# Patient Record
Sex: FEMALE | Race: WHITE | Employment: OTHER | URBAN - METROPOLITAN AREA
[De-identification: names, ages, dates, MRNs, and addresses within clinical notes are randomized per-mention and may not be internally consistent; named-entity substitution may affect disease eponyms.]

---

## 2017-03-09 ENCOUNTER — HOSPITAL ENCOUNTER (OUTPATIENT)
Dept: ULTRASOUND IMAGING | Age: 67
Discharge: HOME | End: 2017-03-09
Attending: INTERNAL MEDICINE
Payer: MEDICARE

## 2017-03-09 DIAGNOSIS — D70.9 EOSINOPENIA (HCC): ICD-10-CM

## 2017-03-09 PROCEDURE — 76700 US EXAM ABDOM COMPLETE: CPT

## 2018-01-17 ENCOUNTER — HOSPITAL ENCOUNTER (OUTPATIENT)
Dept: PREADMISSION TESTING | Age: 68
Discharge: HOME | End: 2018-01-17
Payer: MEDICARE

## 2018-01-17 VITALS
DIASTOLIC BLOOD PRESSURE: 72 MMHG | BODY MASS INDEX: 26.37 KG/M2 | HEIGHT: 67 IN | TEMPERATURE: 97.6 F | WEIGHT: 168 LBS | HEART RATE: 54 BPM | SYSTOLIC BLOOD PRESSURE: 123 MMHG

## 2018-01-17 LAB
ABO + RH BLD: NORMAL
ANION GAP SERPL CALC-SCNC: 6 MMOL/L (ref 5–15)
APPEARANCE UR: CLEAR
ATRIAL RATE: 43 BPM
BACTERIA URNS QL MICRO: NEGATIVE /HPF
BILIRUB UR QL: NEGATIVE
BLOOD GROUP ANTIBODIES SERPL: NORMAL
BUN SERPL-MCNC: 20 MG/DL (ref 6–20)
BUN/CREAT SERPL: 18 (ref 12–20)
CALCIUM SERPL-MCNC: 9.9 MG/DL (ref 8.5–10.1)
CALCULATED P AXIS, ECG09: 62 DEGREES
CALCULATED R AXIS, ECG10: 44 DEGREES
CALCULATED T AXIS, ECG11: 61 DEGREES
CHLORIDE SERPL-SCNC: 104 MMOL/L (ref 97–108)
CO2 SERPL-SCNC: 30 MMOL/L (ref 21–32)
COLOR UR: ABNORMAL
CREAT SERPL-MCNC: 1.11 MG/DL (ref 0.55–1.02)
DIAGNOSIS, 93000: NORMAL
EPITH CASTS URNS QL MICRO: ABNORMAL /LPF
ERYTHROCYTE [DISTWIDTH] IN BLOOD BY AUTOMATED COUNT: 13.1 % (ref 11.5–14.5)
EST. AVERAGE GLUCOSE BLD GHB EST-MCNC: 103 MG/DL
GLUCOSE SERPL-MCNC: 88 MG/DL (ref 65–100)
GLUCOSE UR STRIP.AUTO-MCNC: 100 MG/DL
HBA1C MFR BLD: 5.2 % (ref 4.2–6.3)
HCT VFR BLD AUTO: 42 % (ref 35–47)
HGB BLD-MCNC: 14 G/DL (ref 11.5–16)
HGB UR QL STRIP: NEGATIVE
HYALINE CASTS URNS QL MICRO: ABNORMAL /LPF (ref 0–5)
INR PPP: 1 (ref 0.9–1.1)
KETONES UR QL STRIP.AUTO: NEGATIVE MG/DL
LEUKOCYTE ESTERASE UR QL STRIP.AUTO: NEGATIVE
MCH RBC QN AUTO: 30.8 PG (ref 26–34)
MCHC RBC AUTO-ENTMCNC: 33.3 G/DL (ref 30–36.5)
MCV RBC AUTO: 92.3 FL (ref 80–99)
NITRITE UR QL STRIP.AUTO: NEGATIVE
P-R INTERVAL, ECG05: 160 MS
PH UR STRIP: 6.5 [PH] (ref 5–8)
PLATELET # BLD AUTO: 243 K/UL (ref 150–400)
POTASSIUM SERPL-SCNC: 4.6 MMOL/L (ref 3.5–5.1)
PROT UR STRIP-MCNC: NEGATIVE MG/DL
PROTHROMBIN TIME: 10.6 SEC (ref 9–11.1)
Q-T INTERVAL, ECG07: 448 MS
QRS DURATION, ECG06: 80 MS
QTC CALCULATION (BEZET), ECG08: 378 MS
RBC # BLD AUTO: 4.55 M/UL (ref 3.8–5.2)
RBC #/AREA URNS HPF: ABNORMAL /HPF (ref 0–5)
SODIUM SERPL-SCNC: 140 MMOL/L (ref 136–145)
SP GR UR REFRACTOMETRY: 1.01 (ref 1–1.03)
SPECIMEN EXP DATE BLD: NORMAL
UA: UC IF INDICATED,UAUC: ABNORMAL
UROBILINOGEN UR QL STRIP.AUTO: 0.2 EU/DL (ref 0.2–1)
VENTRICULAR RATE, ECG03: 43 BPM
WBC # BLD AUTO: 3.7 K/UL (ref 3.6–11)
WBC URNS QL MICRO: ABNORMAL /HPF (ref 0–4)

## 2018-01-17 PROCEDURE — 86900 BLOOD TYPING SEROLOGIC ABO: CPT | Performed by: ORTHOPAEDIC SURGERY

## 2018-01-17 PROCEDURE — 93005 ELECTROCARDIOGRAM TRACING: CPT

## 2018-01-17 PROCEDURE — 80048 BASIC METABOLIC PNL TOTAL CA: CPT | Performed by: ORTHOPAEDIC SURGERY

## 2018-01-17 PROCEDURE — 85027 COMPLETE CBC AUTOMATED: CPT | Performed by: ORTHOPAEDIC SURGERY

## 2018-01-17 PROCEDURE — 85610 PROTHROMBIN TIME: CPT | Performed by: ORTHOPAEDIC SURGERY

## 2018-01-17 PROCEDURE — 83036 HEMOGLOBIN GLYCOSYLATED A1C: CPT | Performed by: ORTHOPAEDIC SURGERY

## 2018-01-17 PROCEDURE — 36415 COLL VENOUS BLD VENIPUNCTURE: CPT | Performed by: ORTHOPAEDIC SURGERY

## 2018-01-17 PROCEDURE — 81001 URINALYSIS AUTO W/SCOPE: CPT | Performed by: ORTHOPAEDIC SURGERY

## 2018-01-17 RX ORDER — GLUCOSAMINE/CHONDR SU A SOD 750-600 MG
1000 TABLET ORAL DAILY
COMMUNITY
End: 2022-04-22 | Stop reason: ALTCHOICE

## 2018-01-17 RX ORDER — MINERAL OIL
180 ENEMA (ML) RECTAL DAILY
COMMUNITY

## 2018-01-18 LAB
BACTERIA SPEC CULT: NORMAL
BACTERIA SPEC CULT: NORMAL
SERVICE CMNT-IMP: NORMAL

## 2018-01-19 NOTE — PERIOP NOTES
EKG REVIEWED BY DR. Alexsander Atkinson AND PT NEEDS A CARDIAC CONSULT FOR SURGERY.  SPOKE C DONALD SHANNON'S NURSE AND SHE IS GOING TO CONTACT THE PT.

## 2018-02-07 ENCOUNTER — ANESTHESIA EVENT (OUTPATIENT)
Dept: SURGERY | Age: 68
DRG: 470 | End: 2018-02-07
Payer: MEDICARE

## 2018-02-07 RX ORDER — ACETAMINOPHEN 500 MG
1000 TABLET ORAL ONCE
Status: CANCELLED | OUTPATIENT
Start: 2018-02-07 | End: 2018-02-07

## 2018-02-08 ENCOUNTER — HOSPITAL ENCOUNTER (INPATIENT)
Age: 68
LOS: 1 days | Discharge: HOME HEALTH | DRG: 470 | End: 2018-02-10
Attending: ORTHOPAEDIC SURGERY | Admitting: ORTHOPAEDIC SURGERY
Payer: MEDICARE

## 2018-02-08 ENCOUNTER — ANESTHESIA (OUTPATIENT)
Dept: SURGERY | Age: 68
DRG: 470 | End: 2018-02-08
Payer: MEDICARE

## 2018-02-08 DIAGNOSIS — Z96.651 STATUS POST RIGHT KNEE REPLACEMENT: Primary | ICD-10-CM

## 2018-02-08 PROBLEM — M17.11 PRIMARY OSTEOARTHRITIS OF ONE KNEE, RIGHT: Status: ACTIVE | Noted: 2018-02-08

## 2018-02-08 LAB
ABO + RH BLD: NORMAL
BLOOD GROUP ANTIBODIES SERPL: NORMAL
GLUCOSE BLD STRIP.AUTO-MCNC: 89 MG/DL (ref 65–100)
SERVICE CMNT-IMP: NORMAL
SPECIMEN EXP DATE BLD: NORMAL

## 2018-02-08 PROCEDURE — 76010000171 HC OR TIME 2 TO 2.5 HR INTENSV-TIER 1: Performed by: ORTHOPAEDIC SURGERY

## 2018-02-08 PROCEDURE — C1713 ANCHOR/SCREW BN/BN,TIS/BN: HCPCS | Performed by: ORTHOPAEDIC SURGERY

## 2018-02-08 PROCEDURE — 82962 GLUCOSE BLOOD TEST: CPT

## 2018-02-08 PROCEDURE — 77030020788: Performed by: ORTHOPAEDIC SURGERY

## 2018-02-08 PROCEDURE — G8979 MOBILITY GOAL STATUS: HCPCS

## 2018-02-08 PROCEDURE — 0SRC0J9 REPLACEMENT OF RIGHT KNEE JOINT WITH SYNTHETIC SUBSTITUTE, CEMENTED, OPEN APPROACH: ICD-10-PCS | Performed by: ORTHOPAEDIC SURGERY

## 2018-02-08 PROCEDURE — 99218 HC RM OBSERVATION: CPT

## 2018-02-08 PROCEDURE — 77030018822 HC SLV COMPR FT COVD -B

## 2018-02-08 PROCEDURE — 77030020365 HC SOL INJ SOD CL 0.9% 50ML: Performed by: ORTHOPAEDIC SURGERY

## 2018-02-08 PROCEDURE — 97116 GAIT TRAINING THERAPY: CPT

## 2018-02-08 PROCEDURE — 77030011640 HC PAD GRND REM COVD -A: Performed by: ORTHOPAEDIC SURGERY

## 2018-02-08 PROCEDURE — 77030027138 HC INCENT SPIROMETER -A

## 2018-02-08 PROCEDURE — 77030003601 HC NDL NRV BLK BBMI -A

## 2018-02-08 PROCEDURE — 74011250636 HC RX REV CODE- 250/636: Performed by: ANESTHESIOLOGY

## 2018-02-08 PROCEDURE — 74011000250 HC RX REV CODE- 250: Performed by: ORTHOPAEDIC SURGERY

## 2018-02-08 PROCEDURE — 76210000006 HC OR PH I REC 0.5 TO 1 HR: Performed by: ORTHOPAEDIC SURGERY

## 2018-02-08 PROCEDURE — 74011250636 HC RX REV CODE- 250/636

## 2018-02-08 PROCEDURE — 77030005515 HC CATH URETH FOL14 BARD -B: Performed by: ORTHOPAEDIC SURGERY

## 2018-02-08 PROCEDURE — 77030014077 HC TOWER MX CEM J&J -C: Performed by: ORTHOPAEDIC SURGERY

## 2018-02-08 PROCEDURE — G8978 MOBILITY CURRENT STATUS: HCPCS

## 2018-02-08 PROCEDURE — 74011000250 HC RX REV CODE- 250

## 2018-02-08 PROCEDURE — 36415 COLL VENOUS BLD VENIPUNCTURE: CPT | Performed by: ORTHOPAEDIC SURGERY

## 2018-02-08 PROCEDURE — 77030010507 HC ADH SKN DERMBND J&J -B: Performed by: ORTHOPAEDIC SURGERY

## 2018-02-08 PROCEDURE — 77030018836 HC SOL IRR NACL ICUM -A: Performed by: ORTHOPAEDIC SURGERY

## 2018-02-08 PROCEDURE — 77030031139 HC SUT VCRL2 J&J -A: Performed by: ORTHOPAEDIC SURGERY

## 2018-02-08 PROCEDURE — 77030020782 HC GWN BAIR PAWS FLX 3M -B

## 2018-02-08 PROCEDURE — 74011250637 HC RX REV CODE- 250/637: Performed by: ORTHOPAEDIC SURGERY

## 2018-02-08 PROCEDURE — 77030012935 HC DRSG AQUACEL BMS -B: Performed by: ORTHOPAEDIC SURGERY

## 2018-02-08 PROCEDURE — 77030033067 HC SUT PDO STRATFX SPIR J&J -B: Performed by: ORTHOPAEDIC SURGERY

## 2018-02-08 PROCEDURE — C9290 INJ, BUPIVACAINE LIPOSOME: HCPCS | Performed by: ORTHOPAEDIC SURGERY

## 2018-02-08 PROCEDURE — 77030000032 HC CUF TRNQT ZIMM -B: Performed by: ORTHOPAEDIC SURGERY

## 2018-02-08 PROCEDURE — 97161 PT EVAL LOW COMPLEX 20 MIN: CPT

## 2018-02-08 PROCEDURE — 77030002933 HC SUT MCRYL J&J -A: Performed by: ORTHOPAEDIC SURGERY

## 2018-02-08 PROCEDURE — 76060000035 HC ANESTHESIA 2 TO 2.5 HR: Performed by: ORTHOPAEDIC SURGERY

## 2018-02-08 PROCEDURE — 74011000258 HC RX REV CODE- 258: Performed by: ORTHOPAEDIC SURGERY

## 2018-02-08 PROCEDURE — 77030018846 HC SOL IRR STRL H20 ICUM -A: Performed by: ORTHOPAEDIC SURGERY

## 2018-02-08 PROCEDURE — 77030016547 HC BLD SAW SAG1 STRY -B: Performed by: ORTHOPAEDIC SURGERY

## 2018-02-08 PROCEDURE — 86901 BLOOD TYPING SEROLOGIC RH(D): CPT | Performed by: ORTHOPAEDIC SURGERY

## 2018-02-08 PROCEDURE — C1776 JOINT DEVICE (IMPLANTABLE): HCPCS | Performed by: ORTHOPAEDIC SURGERY

## 2018-02-08 PROCEDURE — 74011250636 HC RX REV CODE- 250/636: Performed by: ORTHOPAEDIC SURGERY

## 2018-02-08 DEVICE — TIB PRN NP STM 5 DEG SZ ER -- PERSONA: Type: IMPLANTABLE DEVICE | Site: KNEE | Status: FUNCTIONAL

## 2018-02-08 DEVICE — IMPLANTABLE DEVICE: Type: IMPLANTABLE DEVICE | Site: KNEE | Status: FUNCTIONAL

## 2018-02-08 DEVICE — INSERT ALL POLY PAT PLY 32MM -- PERSONA: Type: IMPLANTABLE DEVICE | Site: KNEE | Status: FUNCTIONAL

## 2018-02-08 DEVICE — CEMENT BNE GENTAMICIN 40 GM SMARTSET GMV: Type: IMPLANTABLE DEVICE | Site: KNEE | Status: FUNCTIONAL

## 2018-02-08 RX ORDER — SODIUM CHLORIDE 0.9 % (FLUSH) 0.9 %
5-10 SYRINGE (ML) INJECTION AS NEEDED
Status: DISCONTINUED | OUTPATIENT
Start: 2018-02-08 | End: 2018-02-10 | Stop reason: HOSPADM

## 2018-02-08 RX ORDER — NALOXONE HYDROCHLORIDE 0.4 MG/ML
0.4 INJECTION, SOLUTION INTRAMUSCULAR; INTRAVENOUS; SUBCUTANEOUS AS NEEDED
Status: DISCONTINUED | OUTPATIENT
Start: 2018-02-08 | End: 2018-02-10 | Stop reason: HOSPADM

## 2018-02-08 RX ORDER — ONDANSETRON 2 MG/ML
INJECTION INTRAMUSCULAR; INTRAVENOUS AS NEEDED
Status: DISCONTINUED | OUTPATIENT
Start: 2018-02-08 | End: 2018-02-08 | Stop reason: HOSPADM

## 2018-02-08 RX ORDER — DIPHENHYDRAMINE HYDROCHLORIDE 50 MG/ML
12.5 INJECTION, SOLUTION INTRAMUSCULAR; INTRAVENOUS AS NEEDED
Status: DISCONTINUED | OUTPATIENT
Start: 2018-02-08 | End: 2018-02-08 | Stop reason: HOSPADM

## 2018-02-08 RX ORDER — MIDAZOLAM HYDROCHLORIDE 1 MG/ML
1 INJECTION, SOLUTION INTRAMUSCULAR; INTRAVENOUS AS NEEDED
Status: DISCONTINUED | OUTPATIENT
Start: 2018-02-08 | End: 2018-02-08 | Stop reason: HOSPADM

## 2018-02-08 RX ORDER — MIDAZOLAM HYDROCHLORIDE 1 MG/ML
INJECTION, SOLUTION INTRAMUSCULAR; INTRAVENOUS AS NEEDED
Status: DISCONTINUED | OUTPATIENT
Start: 2018-02-08 | End: 2018-02-08 | Stop reason: HOSPADM

## 2018-02-08 RX ORDER — ACETAMINOPHEN 500 MG
500 TABLET ORAL
Status: DISCONTINUED | OUTPATIENT
Start: 2018-02-08 | End: 2018-02-10 | Stop reason: HOSPADM

## 2018-02-08 RX ORDER — PROPOFOL 10 MG/ML
INJECTION, EMULSION INTRAVENOUS
Status: DISCONTINUED | OUTPATIENT
Start: 2018-02-08 | End: 2018-02-08 | Stop reason: HOSPADM

## 2018-02-08 RX ORDER — PROPOFOL 10 MG/ML
INJECTION, EMULSION INTRAVENOUS AS NEEDED
Status: DISCONTINUED | OUTPATIENT
Start: 2018-02-08 | End: 2018-02-08 | Stop reason: HOSPADM

## 2018-02-08 RX ORDER — MIDAZOLAM HYDROCHLORIDE 1 MG/ML
0.5 INJECTION, SOLUTION INTRAMUSCULAR; INTRAVENOUS
Status: DISCONTINUED | OUTPATIENT
Start: 2018-02-08 | End: 2018-02-08 | Stop reason: HOSPADM

## 2018-02-08 RX ORDER — POLYVINYL ALCOHOL 14 MG/ML
1-2 SOLUTION/ DROPS OPHTHALMIC AS NEEDED
Status: DISCONTINUED | OUTPATIENT
Start: 2018-02-08 | End: 2018-02-08 | Stop reason: SDUPTHER

## 2018-02-08 RX ORDER — OXYCODONE HYDROCHLORIDE 5 MG/1
5 TABLET ORAL
Status: DISCONTINUED | OUTPATIENT
Start: 2018-02-08 | End: 2018-02-10 | Stop reason: HOSPADM

## 2018-02-08 RX ORDER — HYDROMORPHONE HYDROCHLORIDE 1 MG/ML
0.5 INJECTION, SOLUTION INTRAMUSCULAR; INTRAVENOUS; SUBCUTANEOUS
Status: DISPENSED | OUTPATIENT
Start: 2018-02-08 | End: 2018-02-09

## 2018-02-08 RX ORDER — ROPIVACAINE HYDROCHLORIDE 5 MG/ML
150 INJECTION, SOLUTION EPIDURAL; INFILTRATION; PERINEURAL AS NEEDED
Status: DISCONTINUED | OUTPATIENT
Start: 2018-02-08 | End: 2018-02-08 | Stop reason: HOSPADM

## 2018-02-08 RX ORDER — BUPIVACAINE HYDROCHLORIDE 7.5 MG/ML
INJECTION, SOLUTION EPIDURAL; RETROBULBAR AS NEEDED
Status: DISCONTINUED | OUTPATIENT
Start: 2018-02-08 | End: 2018-02-08 | Stop reason: HOSPADM

## 2018-02-08 RX ORDER — PHENYLEPHRINE HCL IN 0.9% NACL 0.4MG/10ML
SYRINGE (ML) INTRAVENOUS AS NEEDED
Status: DISCONTINUED | OUTPATIENT
Start: 2018-02-08 | End: 2018-02-08 | Stop reason: HOSPADM

## 2018-02-08 RX ORDER — VANCOMYCIN/0.9 % SOD CHLORIDE 1.5G/250ML
1500 PLASTIC BAG, INJECTION (ML) INTRAVENOUS ONCE
Status: COMPLETED | OUTPATIENT
Start: 2018-02-08 | End: 2018-02-08

## 2018-02-08 RX ORDER — ACETAMINOPHEN 500 MG
1000 TABLET ORAL ONCE
Status: COMPLETED | OUTPATIENT
Start: 2018-02-08 | End: 2018-02-08

## 2018-02-08 RX ORDER — CETIRIZINE HCL 10 MG
10 TABLET ORAL DAILY
Status: DISCONTINUED | OUTPATIENT
Start: 2018-02-09 | End: 2018-02-10 | Stop reason: HOSPADM

## 2018-02-08 RX ORDER — SODIUM CHLORIDE 9 MG/ML
125 INJECTION, SOLUTION INTRAVENOUS CONTINUOUS
Status: DISPENSED | OUTPATIENT
Start: 2018-02-08 | End: 2018-02-09

## 2018-02-08 RX ORDER — POLYETHYLENE GLYCOL 3350 17 G/17G
17 POWDER, FOR SOLUTION ORAL DAILY
Status: DISCONTINUED | OUTPATIENT
Start: 2018-02-09 | End: 2018-02-10 | Stop reason: HOSPADM

## 2018-02-08 RX ORDER — SODIUM CHLORIDE 0.9 % (FLUSH) 0.9 %
5-10 SYRINGE (ML) INJECTION EVERY 8 HOURS
Status: DISCONTINUED | OUTPATIENT
Start: 2018-02-08 | End: 2018-02-08 | Stop reason: HOSPADM

## 2018-02-08 RX ORDER — FACIAL-BODY WIPES
10 EACH TOPICAL DAILY PRN
Status: DISCONTINUED | OUTPATIENT
Start: 2018-02-10 | End: 2018-02-10 | Stop reason: HOSPADM

## 2018-02-08 RX ORDER — SODIUM CHLORIDE, SODIUM LACTATE, POTASSIUM CHLORIDE, CALCIUM CHLORIDE 600; 310; 30; 20 MG/100ML; MG/100ML; MG/100ML; MG/100ML
100 INJECTION, SOLUTION INTRAVENOUS CONTINUOUS
Status: DISCONTINUED | OUTPATIENT
Start: 2018-02-08 | End: 2018-02-08 | Stop reason: HOSPADM

## 2018-02-08 RX ORDER — LIDOCAINE HYDROCHLORIDE 10 MG/ML
0.1 INJECTION, SOLUTION EPIDURAL; INFILTRATION; INTRACAUDAL; PERINEURAL AS NEEDED
Status: DISCONTINUED | OUTPATIENT
Start: 2018-02-08 | End: 2018-02-08 | Stop reason: HOSPADM

## 2018-02-08 RX ORDER — EPHEDRINE SULFATE 50 MG/ML
INJECTION, SOLUTION INTRAVENOUS AS NEEDED
Status: DISCONTINUED | OUTPATIENT
Start: 2018-02-08 | End: 2018-02-08 | Stop reason: HOSPADM

## 2018-02-08 RX ORDER — POLYVINYL ALCOHOL 14 MG/ML
1 SOLUTION/ DROPS OPHTHALMIC AS NEEDED
Status: DISCONTINUED | OUTPATIENT
Start: 2018-02-08 | End: 2018-02-10 | Stop reason: HOSPADM

## 2018-02-08 RX ORDER — OXYCODONE HYDROCHLORIDE 5 MG/1
10 TABLET ORAL
Status: DISCONTINUED | OUTPATIENT
Start: 2018-02-08 | End: 2018-02-10 | Stop reason: HOSPADM

## 2018-02-08 RX ORDER — FLUTICASONE PROPIONATE 50 MCG
2 SPRAY, SUSPENSION (ML) NASAL
Status: DISCONTINUED | OUTPATIENT
Start: 2018-02-08 | End: 2018-02-10 | Stop reason: HOSPADM

## 2018-02-08 RX ORDER — AMOXICILLIN 250 MG
1 CAPSULE ORAL 2 TIMES DAILY
Status: DISCONTINUED | OUTPATIENT
Start: 2018-02-08 | End: 2018-02-10 | Stop reason: HOSPADM

## 2018-02-08 RX ORDER — FENTANYL CITRATE 50 UG/ML
25 INJECTION, SOLUTION INTRAMUSCULAR; INTRAVENOUS
Status: DISCONTINUED | OUTPATIENT
Start: 2018-02-08 | End: 2018-02-08 | Stop reason: HOSPADM

## 2018-02-08 RX ORDER — ASPIRIN 325 MG
325 TABLET, DELAYED RELEASE (ENTERIC COATED) ORAL 2 TIMES DAILY
Status: DISCONTINUED | OUTPATIENT
Start: 2018-02-08 | End: 2018-02-10 | Stop reason: HOSPADM

## 2018-02-08 RX ORDER — ONDANSETRON 2 MG/ML
4 INJECTION INTRAMUSCULAR; INTRAVENOUS
Status: ACTIVE | OUTPATIENT
Start: 2018-02-08 | End: 2018-02-09

## 2018-02-08 RX ORDER — HYDROXYZINE HYDROCHLORIDE 10 MG/1
10 TABLET, FILM COATED ORAL
Status: DISCONTINUED | OUTPATIENT
Start: 2018-02-08 | End: 2018-02-10 | Stop reason: HOSPADM

## 2018-02-08 RX ORDER — SODIUM CHLORIDE 0.9 % (FLUSH) 0.9 %
5-10 SYRINGE (ML) INJECTION AS NEEDED
Status: DISCONTINUED | OUTPATIENT
Start: 2018-02-08 | End: 2018-02-08 | Stop reason: HOSPADM

## 2018-02-08 RX ORDER — SODIUM CHLORIDE 0.9 % (FLUSH) 0.9 %
5-10 SYRINGE (ML) INJECTION EVERY 8 HOURS
Status: DISCONTINUED | OUTPATIENT
Start: 2018-02-09 | End: 2018-02-10 | Stop reason: HOSPADM

## 2018-02-08 RX ORDER — FENTANYL CITRATE 50 UG/ML
50 INJECTION, SOLUTION INTRAMUSCULAR; INTRAVENOUS AS NEEDED
Status: DISCONTINUED | OUTPATIENT
Start: 2018-02-08 | End: 2018-02-08 | Stop reason: HOSPADM

## 2018-02-08 RX ORDER — MORPHINE SULFATE 10 MG/ML
2 INJECTION, SOLUTION INTRAMUSCULAR; INTRAVENOUS
Status: DISCONTINUED | OUTPATIENT
Start: 2018-02-08 | End: 2018-02-08 | Stop reason: HOSPADM

## 2018-02-08 RX ADMIN — ASPIRIN 325 MG: 325 TABLET, DELAYED RELEASE ORAL at 13:28

## 2018-02-08 RX ADMIN — PROPOFOL 50 MCG/KG/MIN: 10 INJECTION, EMULSION INTRAVENOUS at 07:42

## 2018-02-08 RX ADMIN — MIDAZOLAM HYDROCHLORIDE 2 MG: 1 INJECTION, SOLUTION INTRAMUSCULAR; INTRAVENOUS at 07:31

## 2018-02-08 RX ADMIN — DOCUSATE SODIUM AND SENNOSIDES 1 TABLET: 8.6; 5 TABLET, FILM COATED ORAL at 17:40

## 2018-02-08 RX ADMIN — ACETAMINOPHEN 1000 MG: 500 TABLET ORAL at 06:46

## 2018-02-08 RX ADMIN — FENTANYL CITRATE 50 MCG: 50 INJECTION, SOLUTION INTRAMUSCULAR; INTRAVENOUS at 07:12

## 2018-02-08 RX ADMIN — ACETAMINOPHEN 500 MG: 500 TABLET ORAL at 13:28

## 2018-02-08 RX ADMIN — ONDANSETRON 4 MG: 2 INJECTION INTRAMUSCULAR; INTRAVENOUS at 09:25

## 2018-02-08 RX ADMIN — VANCOMYCIN HYDROCHLORIDE 1500 MG: 10 INJECTION, POWDER, LYOPHILIZED, FOR SOLUTION INTRAVENOUS at 18:40

## 2018-02-08 RX ADMIN — ASPIRIN 325 MG: 325 TABLET, DELAYED RELEASE ORAL at 21:29

## 2018-02-08 RX ADMIN — BUPIVACAINE HYDROCHLORIDE 11 MG: 7.5 INJECTION, SOLUTION EPIDURAL; RETROBULBAR at 07:42

## 2018-02-08 RX ADMIN — DOCUSATE SODIUM AND SENNOSIDES 1 TABLET: 8.6; 5 TABLET, FILM COATED ORAL at 13:28

## 2018-02-08 RX ADMIN — VANCOMYCIN HYDROCHLORIDE 1500 MG: 10 INJECTION, POWDER, LYOPHILIZED, FOR SOLUTION INTRAVENOUS at 07:18

## 2018-02-08 RX ADMIN — Medication 80 MCG: at 07:47

## 2018-02-08 RX ADMIN — HYDROMORPHONE HYDROCHLORIDE 0.5 MG: 1 INJECTION, SOLUTION INTRAMUSCULAR; INTRAVENOUS; SUBCUTANEOUS at 13:29

## 2018-02-08 RX ADMIN — EPHEDRINE SULFATE 5 MG: 50 INJECTION, SOLUTION INTRAVENOUS at 07:51

## 2018-02-08 RX ADMIN — EPHEDRINE SULFATE 5 MG: 50 INJECTION, SOLUTION INTRAVENOUS at 07:54

## 2018-02-08 RX ADMIN — TRANEXAMIC ACID 1 G: 100 INJECTION, SOLUTION INTRAVENOUS at 07:47

## 2018-02-08 RX ADMIN — ROPIVACAINE HYDROCHLORIDE 100 MG: 5 INJECTION, SOLUTION EPIDURAL; INFILTRATION; PERINEURAL at 07:13

## 2018-02-08 RX ADMIN — ACETAMINOPHEN 500 MG: 500 TABLET ORAL at 21:29

## 2018-02-08 RX ADMIN — SODIUM CHLORIDE, SODIUM LACTATE, POTASSIUM CHLORIDE, AND CALCIUM CHLORIDE 100 ML/HR: 600; 310; 30; 20 INJECTION, SOLUTION INTRAVENOUS at 07:09

## 2018-02-08 RX ADMIN — SODIUM CHLORIDE 125 ML/HR: 900 INJECTION, SOLUTION INTRAVENOUS at 10:25

## 2018-02-08 RX ADMIN — PROPOFOL 20 MG: 10 INJECTION, EMULSION INTRAVENOUS at 07:43

## 2018-02-08 RX ADMIN — ACETAMINOPHEN 500 MG: 500 TABLET ORAL at 17:40

## 2018-02-08 RX ADMIN — MIDAZOLAM HYDROCHLORIDE 1 MG: 1 INJECTION, SOLUTION INTRAMUSCULAR; INTRAVENOUS at 07:12

## 2018-02-08 RX ADMIN — SODIUM CHLORIDE 125 ML/HR: 900 INJECTION, SOLUTION INTRAVENOUS at 18:41

## 2018-02-08 RX ADMIN — OXYCODONE HYDROCHLORIDE 5 MG: 5 TABLET ORAL at 22:25

## 2018-02-08 RX ADMIN — OXYCODONE HYDROCHLORIDE 5 MG: 5 TABLET ORAL at 18:49

## 2018-02-08 RX ADMIN — Medication 80 MCG: at 07:51

## 2018-02-08 NOTE — PROGRESS NOTES
Primary Nurse Yvan Aguilar RN and Page Shadow RN, RN performed a dual skin assessment on this patient No impairment noted  Nik score is 22

## 2018-02-08 NOTE — PROGRESS NOTES
TRANSFER - IN REPORT:    Verbal report received from Cynthia(name) on Leobardo Perez  being received from PACU(unit) for routine post - op      Report consisted of patients Situation, Background, Assessment and   Recommendations(SBAR). Information from the following report(s) SBAR, Kardex, Intake/Output and MAR was reviewed with the receiving nurse. Opportunity for questions and clarification was provided. Assessment completed upon patients arrival to unit and care assumed.

## 2018-02-08 NOTE — ANESTHESIA PROCEDURE NOTES
Spinal Block    Performed by: Lizett Burr  Authorized by: Lizett Burr     Pre-procedure:   Indications: primary anesthetic  Preanesthetic Checklist: patient identified, risks and benefits discussed, anesthesia consent, site marked, patient being monitored and timeout performed      Spinal Block:   Patient Position:  Seated  Prep Region:  Lumbar  Prep: DuraPrep      Location:  L3-4  Technique:  Single shot        Needle:   Needle Type:  Pencil-tip  Needle Gauge:  25 G  Attempts:  1      Events: CSF confirmed, no blood with aspiration and no paresthesia        Assessment:  Insertion:  Uncomplicated  Patient tolerance:  Patient tolerated the procedure well with no immediate complications  26.5 mg bupiv

## 2018-02-08 NOTE — ANESTHESIA PROCEDURE NOTES
Peripheral Block    Start time: 2/8/2018 7:10 AM  End time: 2/8/2018 7:18 AM  Performed by: Sarita Solis  Authorized by: Sarita Solis       Pre-procedure:    Indications: at surgeon's request, post-op pain management and procedure for pain    Preanesthetic Checklist: patient identified, risks and benefits discussed, site marked, timeout performed, anesthesia consent given and patient being monitored      Block Type:   Block Type:  Popliteal  Laterality:  Right  Monitoring:  Standard ASA monitoring, continuous pulse ox, frequent vital sign checks, heart rate, responsive to questions and oxygen  Injection Technique:  Single shot  Procedures: ultrasound guided and nerve stimulator    Patient Position: supine  Prep: betadine and povidone-iodine 7.5% surgical scrub    Location:  Lower thigh  Needle Type:  Stimuplex  Needle Gauge:  22 G  Needle Localization:  Nerve stimulator and ultrasound guidance  Motor Response: minimal motor response >0.4 mA    Medication Injected:  0.5%  ropivacaine  Volume (mL):  20    Assessment:  Number of attempts:  1  Injection Assessment:  Incremental injection every 5 mL, local visualized surrounding nerve on ultrasound, negative aspiration for CSF, negative aspiration for blood, no paresthesia, no intravascular symptoms and ultrasound image on chart  Patient tolerance:  Patient tolerated the procedure well with no immediate complications

## 2018-02-08 NOTE — IP AVS SNAPSHOT
2703 [facility] [address] [place] [address] 
[phone] Patient: Eros Cloud MRN: [id number] CFB:2/[birth year]/7180 A check wan indicates which time of day the medication should be taken. My Medications START taking these medications Instructions Each Dose to Equal  
 Morning Noon Evening Bedtime  
 aspirin delayed-release 325 mg tablet Your last dose was: Your next dose is: Take 1 Tab by mouth two (2) times a day. For blood clot prevention. 325 mg  
    
   
   
   
  
 oxyCODONE IR 5 mg immediate release tablet Commonly known as:  Marisel Ximena Your last dose was: Your next dose is: Take 0.5-1 Tabs by mouth every four (4) hours as needed. Max Daily Amount: 30 mg. Indications: Pain 2.5-5 mg  
    
   
   
   
  
 senna-docusate 8.6-50 mg per tablet Commonly known as:  June Maribel Your last dose was: Your next dose is: Take 1 Tab by mouth two (2) times a day. Take with full glass of fluid. Do not take if having frequent or loose BMs. Obtain over-the-counter. Indications: Prevention of Postoperative Constipation 1 Tab CHANGE how you take these medications Instructions Each Dose to Equal  
 Morning Noon Evening Bedtime  
 acetaminophen 500 mg tablet Commonly known as:  TYLENOL What changed:   
- how much to take - when to take this 
- additional instructions Your last dose was: Your next dose is: Take 1 Tab by mouth every four (4) hours (while awake). Schedule for pain control over the next 7-14 days. Do not exceed 3000 mg in 24 hours. Obtain over-the-counter. Indications: Postoperative Incisional Knee Pain 500 mg CONTINUE taking these medications Instructions Each Dose to Equal  
 Morning Noon Evening Bedtime Biotin 2,500 mcg Cap Your last dose was: Your next dose is: Take 1,000 mcg by mouth daily. 1000 mcg  
    
   
   
   
  
 fexofenadine 180 mg tablet Commonly known as:  Meri Casas Your last dose was: Your next dose is: Take 180 mg by mouth daily. 180 mg  
    
   
   
   
  
 FLONASE 50 mcg/actuation nasal spray Generic drug:  fluticasone Your last dose was: Your next dose is:    
   
   
 nightly. GenTeal Moderate 0.3 % Drop Generic drug:  artificial tears(hypromellose) Your last dose was: Your next dose is:    
   
   
 Administer  to both eyes as needed. multivitamin tablet Commonly known as:  ONE A DAY Your last dose was: Your next dose is: Take 1 tablet by mouth daily. 1 Tab MYRBETRIQ 25 mg ER tablet Generic drug:  mirabegron ER Your last dose was: Your next dose is: Take  by mouth daily. OTHER Your last dose was: Your next dose is:    
   
   
 USES 1 PROMISE EASY TEARS- HAS VITAMIN A, D, E AND FISH OIL IN IT.  
     
   
   
   
  
 OTHER(NON-FORMULARY) Your last dose was: Your next dose is:    
   
   
 Eye promise 2 tabs daily REFRESH LIQUIGEL 1 % Dlgl Generic drug:  carboxymethylcellulose sodium Your last dose was: Your next dose is:    
   
   
 Apply 1 drop to eye four (4) times daily as needed. 1 Drop RESTASIS 0.05 % ophthalmic emulsion Generic drug:  cycloSPORINE Your last dose was: Your next dose is:    
   
   
 Administer 1 drop to both eyes two (2) times a day. 1 Drop SOOTHE XP OP Your last dose was: Your next dose is:    
   
   
 Apply  to eye. STOP taking these medications   
 traMADol 50 mg tablet Commonly known as:  Brook Burgos  
 Where to Get Your Medications Information on where to get these meds will be given to you by the nurse or doctor. ! Ask your nurse or doctor about these medications  
  acetaminophen 500 mg tablet  
 aspirin delayed-release 325 mg tablet  
 oxyCODONE IR 5 mg immediate release tablet  
 senna-docusate 8.6-50 mg per tablet

## 2018-02-08 NOTE — ANESTHESIA POSTPROCEDURE EVALUATION
Post-Anesthesia Evaluation and Assessment    Patient: Roberto Mirza MRN: [id number]  SSN: [id number]    YOB: 1950  Age: 69 y.o. Sex: female       Cardiovascular Function/Vital Signs  Visit Vitals    /55    Pulse 61    Temp 36.4 °C (97.6 °F)    Resp 11    Ht 5' 6.5\" (1.689 m)    Wt 76.2 kg (168 lb)    SpO2 99%    BMI 26.71 kg/m2       Patient is status post spinal anesthesia for Procedure(s):  RIGHT TOTAL KNEE ARTHROPLASTY  (SPINAL WITH IV SED). Nausea/Vomiting: None    Postoperative hydration reviewed and adequate. Pain:  Pain Scale 1: Numeric (0 - 10) (02/08/18 3418)  Pain Intensity 1: 0 (02/08/18 9842)   Managed    Neurological Status:   Neuro (WDL): Within Defined Limits (02/08/18 4021)   At baseline    Mental Status and Level of Consciousness: Arousable    Pulmonary Status:   O2 Device: Nasal cannula (02/08/18 0950)   Adequate oxygenation and airway patent    Complications related to anesthesia: None    Post-anesthesia assessment completed.  No concerns    Signed By: Traci Kinsey MD     February 8, 2018

## 2018-02-08 NOTE — IP AVS SNAPSHOT
110 [facility] [address] [place] [address] 
[phone] Patient: Trang Hand MRN: [id number] JCA:3/0/7460 About your hospitalization You were admitted on:  February 8, 2018 [facility] on:  February 10, 2018 Why you were hospitalized Your primary diagnosis was:  Not on File Your diagnoses also included:  Primary Osteoarthritis Of One Knee, Right, Osteoarthritis Of Right Knee Follow-up Information Follow up With Details Comments Contact Info AT [place] [address] 
[phone] MD Daniel Fong [phone] Discharge Orders None A check wan indicates which time of day the medication should be taken. My Medications START taking these medications Instructions Each Dose to Equal  
 Morning Noon Evening Bedtime  
 aspirin delayed-release 325 mg tablet Your last dose was: Your next dose is: Take 1 Tab by mouth two (2) times a day. For blood clot prevention. 325 mg  
    
   
   
   
  
 oxyCODONE IR 5 mg immediate release tablet Commonly known as:  Serena Miners Your last dose was: Your next dose is: Take 0.5-1 Tabs by mouth every four (4) hours as needed. Max Daily Amount: 30 mg. Indications: Pain 2.5-5 mg  
    
   
   
   
  
 senna-docusate 8.6-50 mg per tablet Commonly known as:  Deyanira Spruce Your last dose was: Your next dose is: Take 1 Tab by mouth two (2) times a day. Take with full glass of fluid. Do not take if having frequent or loose BMs. Obtain over-the-counter. Indications: Prevention of Postoperative Constipation 1 Tab CHANGE how you take these medications Instructions Each Dose to Equal  
 Morning Noon Evening Bedtime acetaminophen 500 mg tablet Commonly known as:  TYLENOL What changed:   
- how much to take - when to take this 
- additional instructions Your last dose was: Your next dose is: Take 1 Tab by mouth every four (4) hours (while awake). Schedule for pain control over the next 7-14 days. Do not exceed 3000 mg in 24 hours. Obtain over-the-counter. Indications: Postoperative Incisional Knee Pain 500 mg CONTINUE taking these medications Instructions Each Dose to Equal  
 Morning Noon Evening Bedtime Biotin 2,500 mcg Cap Your last dose was: Your next dose is: Take 1,000 mcg by mouth daily. 1000 mcg  
    
   
   
   
  
 fexofenadine 180 mg tablet Commonly known as:  Theodore Stubbs Your last dose was: Your next dose is: Take 180 mg by mouth daily. 180 mg  
    
   
   
   
  
 FLONASE 50 mcg/actuation nasal spray Generic drug:  fluticasone Your last dose was: Your next dose is:    
   
   
 nightly. GenTeal Moderate 0.3 % Drop Generic drug:  artificial tears(hypromellose) Your last dose was: Your next dose is:    
   
   
 Administer  to both eyes as needed. multivitamin tablet Commonly known as:  ONE A DAY Your last dose was: Your next dose is: Take 1 tablet by mouth daily. 1 Tab MYRBETRIQ 25 mg ER tablet Generic drug:  mirabegron ER Your last dose was: Your next dose is: Take  by mouth daily. OTHER Your last dose was: Your next dose is:    
   
   
 USES 1 PROMISE EASY TEARS- HAS VITAMIN A, D, E AND FISH OIL IN IT.  
     
   
   
   
  
 OTHER(NON-FORMULARY) Your last dose was: Your next dose is:    
   
   
 Eye promise 2 tabs daily REFRESH LIQUIGEL 1 % Dlgl Generic drug:  carboxymethylcellulose sodium Your last dose was: Your next dose is:    
   
   
 Apply 1 drop to eye four (4) times daily as needed. 1 Drop RESTASIS 0.05 % ophthalmic emulsion Generic drug:  cycloSPORINE Your last dose was: Your next dose is:    
   
   
 Administer 1 drop to both eyes two (2) times a day. 1 Drop SOOTHE XP OP Your last dose was: Your next dose is:    
   
   
 Apply  to eye. STOP taking these medications   
 traMADol 50 mg tablet Commonly known as:  ULTRAM  
   
  
  
  
Where to Get Your Medications Information on where to get these meds will be given to you by the nurse or doctor. ! Ask your nurse or doctor about these medications  
  acetaminophen 500 mg tablet  
 aspirin delayed-release 325 mg tablet  
 oxyCODONE IR 5 mg immediate release tablet  
 senna-docusate 8.6-50 mg per tablet Discharge Instructions Patient meets criteria for BUNDLED PAYMENT  
for Care Improvement Initiative Criteria Contact Information for Orthopedic Nurse Navigator:     
ERIKA Barnes, RN-BC 
D:[phone] F:[phone] X:[phone] After Hospital Care Plan:  Discharge Instructions Knee Replacement- Dr. Grecia Alvarez Patient Name: Roberto Mirza Date of procedure: 2/8/2018 Procedure: Procedure(s): RIGHT TOTAL KNEE ARTHROPLASTY  (SPINAL WITH IV SED) Surgeon: Surgeon(s) and Role: Maddy Pineda MD - Primary PCP: Steven Pena MD 
Date of discharge: No discharge date for patient encounter. Follow up appointments ? Follow up with Dr. Grecia Alvarez in 3 weeks. Call [phone] to make an appointment. ? If home health has been arranged for you the agency will contact you to arrange dates/times for visits. Please call them if you do not hear from them within 24 hours after you are discharged When to call your Orthopaedic Surgeon: Call [phone]. If you call after 5pm or on a weekend, the on call physician will be contacted ? Unrelieved pain ? Signs of infection-if your incision is red; continues to have drainage; drainage has a foul odor or if you have a persistent fever over 101 degrees ? Signs of a blood clot in your leg-calf pain, tenderness, redness, swelling of lower leg When to call your Primary Care Physician: 
? Concerns about medical conditions such as diabetes, high blood pressure, asthma, congestive heart failure ? Call if blood sugars are elevated, persistent headache or dizziness, coughing or congestion, constipation or diarrhea, burning with urination, abnormal heart rate When to call 911and go to the nearest emergency room ? Acute onset of chest pain, shortness of breath, difficulty breathing Activity ? Weight bearing as tolerated with walker or crutches. Refer to pages 23-31 of your handbook for instructions and pictures ? Complete your Home Exercise Program daily as instructed by your therapist.  Refer to pages 33-41 of your handbook for instructions and pictures ? Get up every one hour and walk (except at night when sleeping) ? Do not drive or operate heavy machinery Incision Care ? The Aquacel (brown, waterproof) surgical dressing is to remain on your knee for 7 days. On the 7th day have someone gently peel the dressing off by carefully lifting the edge and stretching it slightly to break the adhesive seal 
? If you have steri-strips (small, white pieces of tape) on your incision, they may come off when you remove the Aquacel surgical dressing. This is okay. You may now leave your incision open to air ? If your Aquacel dressing comes loose/off before the 7th day, you may replace it with a dry sterile gauze dressing; change it daily. Once you incision is not draining, you may leave it open to air ? You may take a shower with the Aquacel dressing in place. Once the Aquacel is removed, you may shower and get your incision wet but do not submerge your incision under water in a bath tub, hot tub or swimming pool for 6 weeks after surgery. Preventing blood clots ? Take Enteric coated Aspirin (delayed release) one tablet twice a day for one month following surgery Pain management ? Take pain medication as prescribed with food and fluids; decrease the amount you use as your pain lessens ? Avoid alcoholic beverages while taking pain medication ? Please be aware that many medications contain Tylenol (acetaminophen). We do not want you to over medicate so please read the information below as a guide. Do not take more than 3 Grams of Tylenol in a 24 hour period. (There are 1000 milligrams in one Gram) o Tylenol 325 mg per tablet (do not take more than 9 tablets in 24 hours) o Tylenol 500 mg per tablet (do not take more than 6 tablets in 24 hours) o Tylenol 650 mg per tablet (do not take more than 4 tablets in 24 hours) ? Elevate your leg (do not bend/flex knee) and place ice bags on your knee for 15-20 minutes after exercising and as needed for comfort Diet ? Resume usual diet; drink plenty of fluids; eat foods high in fiber ? You may want to take a stool softener (such as Senokot-S or Colace) to prevent constipation while you are taking pain medication. If constipation occurs, take a laxative (such as Dulcolax tablets, Milk of Magnesia, or a suppository) Home Health Care Protocol (to be followed by [address]) Nursing-per physicians order ? Complete head to toe assessment, vital signs ? Medication reconciliation ? Review pain management ? Manage chronic medical conditions Physical Therapy-per physicians order Weight bearing status: 
Precautions at Admission: WBAT Right Side Weight Bearing: As tolerated Mobility Status: 
Supine to Sit: Stand-by asssistance Sit to Stand: Stand-by asssistance Sit to Supine: Stand-by asssistance Gait: 
Distance (ft): 300 Feet (ft) Ambulation - Level of Assistance: Stand-by asssistance Assistive Device: Gait belt, Walker, rolling Gait Abnormalities: Decreased step clearance, Antalgic ADL status overall composite: 
  
  
  
  
 
Physical Therapy ? Assessment and evaluation-bed mobility; functional transfers (bed, chair, bathroom, stairs); ambulation with equipment, car transfers, shower transfers, safety and ability to get out of house in the event of an emergency ? Review weight bearing as tolerated, wean from walker or crutches as tolerated ? Discuss pain management ? Review how to do ADLs. Refer to page 42 of patient handbook Home Exercise Program-refer to pages 33-41 of patient handbook for exercises. Introducing [facility] SERVICES! Dear Amador Evans: Thank you for requesting a [employer] account. Our records indicate that you already have an active [employer] account. You can access your account anytime at [email or web address] Did you know that you can access your hospital and ER discharge instructions at any time in [employer]? You can also review all of your test results from your hospital stay or ER visit. Additional Information If you have questions, please visit the Frequently Asked Questions section of the [employer] website at [email or web address] Remember, [employer] is NOT to be used for urgent needs. For medical emergencies, dial 911. Now available from your iPhone and Android! Providers Seen During Your Hospitalization Provider Specialty Primary office phone Angela Snider MD Orthopedic Surgery [phone] Your Primary Care Physician (PCP) Primary Care Physician Office Phone Office Fax Kortney Mello [phone] You are allergic to the following Allergen Reactions Prednisone Other (comments) Effects pts vision permanentely Augmentin (Amoxicillin-Pot Clavulanate) Rash Rash of lips Entex (Phenylephrine-Guaifenesin) Rash Heart racing Levofloxacin Myalgia Knees hurt Lotemax (Loteprednol Etabonate) Other (comments)  
 headaches Omnicef (Cefdinir) Rash Mouth blisters Other Plant, Animal, Environmental Other (comments) Styrofoam cups, blisters to mouth Penicillin G Rash Rash on lips Scallops Nausea and Vomiting And sensitive skin Shellfish Derived Nausea and Vomiting Scallops only Recent Documentation Height Weight BMI OB Status Smoking Status 1.689 m 76.2 kg 26.71 kg/m2 Hysterectomy Never Smoker Emergency Contacts Name Discharge Info Relation Home Work Mobile Veronica Duncan CAREGIVER [3] Spouse [3] [phone] Patient Belongings The following personal items are in your possession at time of discharge: 
  Dental Appliances: None  Visual Aid: Glasses      Home Medications: None   Jewelry: None  Clothing: Other (comment) (CLOTHING & SHOES TO PACU)    Other Valuables: Eyeglasses (GLASSES TO PACU) Please provide this summary of care documentation to your next provider. Signatures-by signing, you are acknowledging that this After Visit Summary has been reviewed with you and you have received a copy. Patient Signature:  ____________________________________________________________ Date:  ____________________________________________________________  
  
Devota Dandy Provider Signature:  ____________________________________________________________ Date:  ____________________________________________________________

## 2018-02-08 NOTE — PERIOP NOTES
TRANSFER - OUT REPORT:    Verbal report given to aric (name) on Breana Ge  being transferred to room [address] (unit) for routine post - op       Report consisted of patients Situation, Background, Assessment and   Recommendations(SBAR). Time Pre op antibiotic given:0718   Anesthesia Stop time: 1847   Jimenes Present on Transfer to floor:yes  Order for Jimenes on Chart:yes  Discharge Prescriptions with Chart:no    Information from the following report(s) SBAR, Kardex, OR Summary, Intake/Output, MAR, Med Rec Status and Cardiac Rhythm SR was reviewed with the receiving nurse. Opportunity for questions and clarification was provided. Is the patient on 02? YES       L/Min 2L          Is the patient on a monitor? NO    Is the nurse transporting with the patient? NO    Surgical Waiting Area notified of patient's transfer from PACU? YES      The following personal items collected during your admission accompanied patient upon transfer:   Dental Appliance: Dental Appliances: None  Vision: Visual Aid: Glasses  Hearing Aid:    Jewelry: Jewelry: None  Clothing: Clothing: Other (comment) (CLOTHING & SHOES TO PACU)  Other Valuables:  Other Valuables: Eyeglasses (GLASSES TO PACU)  Valuables sent to safe:

## 2018-02-08 NOTE — ANESTHESIA PREPROCEDURE EVALUATION
Anesthetic History   No history of anesthetic complications            Review of Systems / Medical History  Patient summary reviewed, nursing notes reviewed and pertinent labs reviewed    Pulmonary  Within defined limits      Sleep apnea           Neuro/Psych   Within defined limits      Psychiatric history     Cardiovascular  Within defined limits                     GI/Hepatic/Renal  Within defined limits   GERD           Endo/Other  Within defined limits           Other Findings              Physical Exam    Airway  Mallampati: II  TM Distance: > 6 cm  Neck ROM: normal range of motion   Mouth opening: Normal     Cardiovascular  Regular rate and rhythm,  S1 and S2 normal,  no murmur, click, rub, or gallop             Dental  No notable dental hx       Pulmonary  Breath sounds clear to auscultation               Abdominal  GI exam deferred       Other Findings            Anesthetic Plan    ASA: 2  Anesthesia type: spinal      Post-op pain plan if not by surgeon: peripheral nerve block single    Induction: Intravenous  Anesthetic plan and risks discussed with: Patient

## 2018-02-08 NOTE — BRIEF OP NOTE
BRIEF OPERATIVE NOTE    Date of Procedure: 2/8/2018   Preoperative Diagnosis: OA RIGHT KNEE  Postoperative Diagnosis: OA RIGHT KNEE    Procedure(s):  RIGHT TOTAL KNEE ARTHROPLASTY  (SPINAL WITH IV SED)  Surgeon(s) and Role:     * Kell Damon MD - Primary         Assistant Staff: None      Surgical Staff:  Circ-1: Thomes Hashimoto, RN  Scrub RN-1: Anatoly Sanchez RN  Retractor Hall: Rina Chow  Surg Asst-1: Mira Khanna  Event Time In   Incision Start 0809   Incision Close      Anesthesia: Spinal   Estimated Blood Loss: 100  Specimens: * No specimens in log *   Findings: severe djd   Complications: none  Implants:   Implant Name Type Inv.  Item Serial No.  Lot No. LRB No. Used Action   CEMENT BNE GENTAMC MV 40GM -- SMARTSET ENDURANCE - SNA  CEMENT BNE GENTAMC MV 40GM -- SMARTSET ENDURANCE NA [facility] [employer] [id number] Right 1 Implanted   CEMENT BNE GENTAMC MV 40GM -- SMARTSET ENDURANCE - SNA  CEMENT BNE GENTAMC MV 40GM -- SMARTSET ENDURANCE NA [facility] [employer] [id number] Right 1 Implanted   INSERT ALL POLY PAT PLY 32MM -- PERSONA - SNA  INSERT ALL POLY PAT PLY 32MM -- PERSONA NA [employer] [id number] Right 1 Implanted   INSERT ASF PS 10MM VE R 6-9 EF -- PERSONA - SNA  INSERT ASF PS 10MM VE R 6-9 EF -- PERSONA NA [employer] [id number] Right 1 Implanted   TIB PRN NP STM 5 DEG SZ ER -- PERSONA - SNA  TIB PRN NP STM 5 DEG SZ ER -- PERSONA [employer] [id number] Right 1 Implanted   FEM PS PEPE CCR STD SZ 7 RT -- PERSONA - SNA   FEM PS PEPE CCR STD SZ 7 RT -- PERSONA NA [employer] [id number] Right 1 Implanted

## 2018-02-08 NOTE — PROGRESS NOTES
Problem: Mobility Impaired (Adult and Pediatric)  Goal: *Acute Goals and Plan of Care (Insert Text)  Physical Therapy Goals  Initiated 2/8/2018    1. Patient will move from supine to sit and sit to supine  and scoot up and down in bed with supervision/set-up within 4 days. 2. Patient will perform sit to stand with supervision/set-up within 4 days. 3. Patient will ambulate with supervision/set-up for 200 feet with the least restrictive device within 4 days. 4. Patient will ascend/descend 6 stairs with one handrail(s) with supervision/set-up within 4 days. 5. Patient will perform home exercise program per protocol with independence within 4 days. 6. Patient will demonstrate AROM 0-90 degrees in operative joint within 4 days. physical Therapy knee EVALUATION  Patient: Nancy Cruz (89 y.o. female)  Date: 2/8/2018  Primary Diagnosis: OA RIGHT KNEE  Primary osteoarthritis of one knee, right  Procedure(s) (LRB):  RIGHT TOTAL KNEE ARTHROPLASTY  (SPINAL WITH IV SED) (Right) Day of Surgery   Precautions:   WBAT    ASSESSMENT :  Based on the objective data described below, the patient presents with impaired functional mobility as compared to baseline level 2* generalized post op pain, limited ROM, and decreased strength leading to impaired gait and balance s/p R TKA, POD #0. Prior to this admission, pt reports that she lived at home [facility] [address] [place] with 6 JIMENEZ) with her spouse and was indep with all ADLs and mobility w/o use of AD (has borrowed a RW). Pt was cleared for mobility by RN and demonstrates intact R SLR and DF. Reports mild diminished sensation R LE. Orthostatic vitals assessed and were negative (see flow sheet). Provided education on basic HEP and positioning recommendations. She was able to complete bed mobility with SUP/CGA through use of compensatory strategies to assist with R LE management. Completed sit<>stands from EOB with up to min A - good standing balance.  Able to ambulate ~35ft with RW and CGA - mild antalgic pattern however able to achieve near reciprocal progression with duration. No R knee buckle noted. Pt assisted back to bed at end of session, NAD, VSS. Anticipate that she will make excellent progress during acute stay and be appropriate for discharge home with family assist and HHPT. Patient will benefit from skilled intervention to address the above impairments. Patients rehabilitation potential is considered to be Good  Factors which may influence rehabilitation potential include:   [x]         None noted  []         Mental ability/status  []         Medical condition  []         Home/family situation and support systems  []         Safety awareness  []         Pain tolerance/management  []         Other:      PLAN :  Recommendations and Planned Interventions:  [x]           Bed Mobility Training             [x]    Neuromuscular Re-Education  [x]           Transfer Training                   []    Orthotic/Prosthetic Training  [x]           Gait Training                         []    Modalities  [x]           Therapeutic Exercises           []    Edema Management/Control  [x]           Therapeutic Activities            [x]    Patient and Family Training/Education  []           Other (comment):    Frequency/Duration: Patient will be followed by physical therapy twice daily to address goals. Discharge Recommendations: Home Health  Further Equipment Recommendations for Discharge: None - has a RW     SUBJECTIVE:   Patient stated Do you think I will leave tomorrow?     OBJECTIVE DATA SUMMARY:   HISTORY:    Past Medical History:   Diagnosis Date    Arthritis     osteo    Cancer ([facility] [address]     squamous cell on nose lt side    Chronic kidney disease     seeing dr alvarez for overactive bladder/ kidneys dr Willam Sidhu    Chronic tubulointerstitial nephritis     Erythema nodosum     RIGHT LEG AND FOOT    History of shingles     Ill-defined condition     possible tubulointerstitual disease    Ill-defined condition     raynaud's phenomenon    Ill-defined condition     erythema nodosum rt leg and foot    Ill-defined condition     plantar fiascitis    Ill-defined disease     lickens planus lt side of tongue    Psychiatric disorder     depression    Raynaud's phenomenon (by history or observed)     Sjogren's disease ([facility] [address]     Tinnitus      Past Surgical History:   Procedure Laterality Date    ABDOMEN SURGERY PROC UNLISTED  1/27/15    DAVINCI RIGHT ADRENALECTOMY and CHOLECYSTECOMTY    HX APPENDECTOMY      HX CATARACT REMOVAL Bilateral 2016    HX CHOLECYSTECTOMY  2015    HX COLONOSCOPY  2008    HX GYN      3 vaginal delivery    HX HYSTERECTOMY      HX KNEE REPLACEMENT  01/2017    LEFT TOTAL KNEE REPLACEMENT     HX ORTHOPAEDIC  2003    right knee 2003- [address], ARTHROSCOPIC    HX OTHER SURGICAL      STRESS TEST IN 2010 - NORMAL PER PATIENT    HX UROLOGICAL      RIGHT ADRENAL MASS      Prior Level of Function/Home Situation: Lives at home with spouse, [facility] with 6 steps to enter, indep at baseline   Personal factors and/or comorbidities impacting plan of care:     Home Situation  Home Environment: Private residence  # Steps to Enter: 6  Rails to Enter: Yes  One/Two Story Residence: One story  Living Alone: No  Support Systems: Spouse/Significant Other/Partner  Patient Expects to be Discharged to[de-identified] Private residence  Current DME Used/Available at Home: Walker, rolling    EXAMINATION/PRESENTATION/DECISION MAKING:   Critical Behavior:  Neurologic State: Alert  Orientation Level: Oriented X4  Cognition: Appropriate decision making, Follows commands  Safety/Judgement: Good awareness of safety precautions  Hearing:   Auditory  Auditory Impairment: None  Skin:  Intact where exposed, R LE covered with ace bandage  Range Of Motion:  AROM: Generally decreased, functional     Strength:    Strength: Generally decreased, functional     Tone & Sensation:   Tone: Normal   Sensation: Intact (reports still slightly diminished R LE)      Coordination:  Coordination: Within functional limits  Vision:   Functional Mobility:  Bed Mobility:     Supine to Sit: Supervision  Sit to Supine: Contact guard assistance (for R LE management)     Transfers:  Sit to Stand: Minimum assistance  Stand to Sit: Minimum assistance     Balance:   Sitting: Intact  Standing: Impaired  Standing - Static: Good;Constant support  Standing - Dynamic : Good  Ambulation/Gait Training:  Distance (ft): 35 Feet (ft)  Assistive Device: Gait belt;Walker, rolling  Ambulation - Level of Assistance: Contact guard assistance   Gait Description (WDL): Exceptions to WDL  Gait Abnormalities: Antalgic  Right Side Weight Bearing: As tolerated  Stance: Right decreased  Speed/Irena: Slow  Step Length: Right lengthened  Swing Pattern: Right asymmetrical    Functional Measure:  Tinetti test:    Sitting Balance: 1  Arises: 0  Attempts to Rise: 0  Immediate Standing Balance: 1  Standing Balance: 1  Nudged: 2  Eyes Closed: 1  Turn 360 Degrees - Continuous/Discontinuous: 0  Turn 360 Degrees - Steady/Unsteady: 1  Sitting Down: 1  Balance Score: 8  Indication of Gait: 1  R Step Length/Height: 1  L Step Length/Height: 1  R Foot Clearance: 1  L Foot Clearance: 1  Step Symmetry: 1  Step Continuity: 1  Path: 1  Trunk: 0  Walking Time: 1  Gait Score: 9  Total Score: 17       Tinetti Test and G-code impairment scale:  Percentage of Impairment CH    0%   CI    1-19% CJ    20-39% CK    40-59% CL    60-79% CM    80-99% CN     100%   Tinetti  Score 0-28 28 23-27 17-22 12-16 6-11 1-5 0       Tinetti Tool Score Risk of Falls  <19 = High Fall Risk  19-24 = Moderate Fall Risk  25-28 = Low Fall Risk  Tinetti ME. Performance-Oriented Assessment of Mobility Problems in Elderly Patients. Randall [address]; [id number].  (Scoring Description: PT Bulletin Feb. 10, 1993)    Older adults: Steve Alberto et al, 2009; n = [address] elderly evaluated with ABC, BRUNA, ADL, and IADL)  · Mean BRUNA score for males aged 65-79 years = 26.21(3.40)  · Mean BRUNA score for females age 69-68 years = 25.16(4.30)  · Mean BRUNA score for males over 80 years = 23.29(6.02)  · Mean BRUNA score for females over 80 years = 17.20(8.32)       G codes: In compliance with CMSs Claims Based Outcome Reporting, the following G-code set was chosen for this patient based on their primary functional limitation being treated: The outcome measure chosen to determine the severity of the functional limitation was the Tinetti with a score of 17/28 which was correlated with the impairment scale. ? Mobility - Walking and Moving Around:     - CURRENT STATUS: CJ - 20%-39% impaired, limited or restricted    - GOAL STATUS: CI - 1%-19% impaired, limited or restricted    - D/C STATUS:  ---------------To be determined---------------        Physical Therapy Evaluation Charge Determination   History Examination Presentation Decision-Making   MEDIUM  Complexity : 1-2 comorbidities / personal factors will impact the outcome/ POC  MEDIUM Complexity : 3 Standardized tests and measures addressing body structure, function, activity limitation and / or participation in recreation  LOW Complexity : Stable, uncomplicated  MEDIUM Complexity : FOTO score of 26-74      Based on the above components, the patient evaluation is determined to be of the following complexity level: LOW     Pain:  Pain Scale 1: Numeric (0 - 10)  Pain Intensity 1: 2  Pain Location 1: Knee  Pain Orientation 1: Right  Pain Description 1: Aching  Pain Intervention(s) 1: Medication (see MAR)  Activity Tolerance:   NAD, VSS    Please refer to the flowsheet for vital signs taken during this treatment.   After treatment:   []         Patient left in no apparent distress sitting up in chair  [x]         Patient left in no apparent distress in bed  [x]         Call bell left within reach  [x]         Nursing notified  [x]         Caregiver present  []         Bed alarm activated    COMMUNICATION/EDUCATION:   The patients plan of care was discussed with: Registered Nurse. [x]         Fall prevention education was provided and the patient/caregiver indicated understanding. [x]         Patient/family have participated as able in goal setting and plan of care. [x]         Patient/family agree to work toward stated goals and plan of care. []         Patient understands intent and goals of therapy, but is neutral about his/her participation. []         Patient is unable to participate in goal setting and plan of care.     Thank you for this referral.  Jarod Taylor, PT , DPT   Time Calculation: 22 mins

## 2018-02-09 PROBLEM — M17.11 OSTEOARTHRITIS OF RIGHT KNEE: Status: ACTIVE | Noted: 2018-02-09

## 2018-02-09 LAB
ANION GAP SERPL CALC-SCNC: 5 MMOL/L (ref 5–15)
BUN SERPL-MCNC: 16 MG/DL (ref 6–20)
BUN/CREAT SERPL: 16 (ref 12–20)
CALCIUM SERPL-MCNC: 8.6 MG/DL (ref 8.5–10.1)
CHLORIDE SERPL-SCNC: 115 MMOL/L (ref 97–108)
CO2 SERPL-SCNC: 25 MMOL/L (ref 21–32)
CREAT SERPL-MCNC: 0.99 MG/DL (ref 0.55–1.02)
GLUCOSE SERPL-MCNC: 97 MG/DL (ref 65–100)
HGB BLD-MCNC: 11.8 G/DL (ref 11.5–16)
POTASSIUM SERPL-SCNC: 4.1 MMOL/L (ref 3.5–5.1)
SODIUM SERPL-SCNC: 145 MMOL/L (ref 136–145)

## 2018-02-09 PROCEDURE — 36415 COLL VENOUS BLD VENIPUNCTURE: CPT | Performed by: ORTHOPAEDIC SURGERY

## 2018-02-09 PROCEDURE — 97110 THERAPEUTIC EXERCISES: CPT

## 2018-02-09 PROCEDURE — 85018 HEMOGLOBIN: CPT | Performed by: ORTHOPAEDIC SURGERY

## 2018-02-09 PROCEDURE — 80048 BASIC METABOLIC PNL TOTAL CA: CPT | Performed by: ORTHOPAEDIC SURGERY

## 2018-02-09 PROCEDURE — 65270000029 HC RM PRIVATE

## 2018-02-09 PROCEDURE — 74011250637 HC RX REV CODE- 250/637: Performed by: ORTHOPAEDIC SURGERY

## 2018-02-09 PROCEDURE — 99218 HC RM OBSERVATION: CPT

## 2018-02-09 PROCEDURE — 74011250636 HC RX REV CODE- 250/636: Performed by: ORTHOPAEDIC SURGERY

## 2018-02-09 PROCEDURE — 97116 GAIT TRAINING THERAPY: CPT

## 2018-02-09 RX ORDER — ACETAMINOPHEN 500 MG
500 TABLET ORAL
Qty: 60 TAB | Refills: 0 | Status: SHIPPED
Start: 2018-02-09 | End: 2022-07-25 | Stop reason: SDUPTHER

## 2018-02-09 RX ORDER — AMOXICILLIN 250 MG
1 CAPSULE ORAL 2 TIMES DAILY
Qty: 60 TAB | Refills: 0 | Status: SHIPPED
Start: 2018-02-09 | End: 2018-02-09

## 2018-02-09 RX ORDER — ACETAMINOPHEN 500 MG
500 TABLET ORAL
Qty: 60 TAB | Refills: 0 | Status: SHIPPED
Start: 2018-02-09 | End: 2018-02-09

## 2018-02-09 RX ORDER — AMOXICILLIN 250 MG
1 CAPSULE ORAL 2 TIMES DAILY
Qty: 60 TAB | Refills: 0 | Status: SHIPPED
Start: 2018-02-09 | End: 2022-04-22 | Stop reason: ALTCHOICE

## 2018-02-09 RX ADMIN — OXYCODONE HYDROCHLORIDE 5 MG: 5 TABLET ORAL at 04:24

## 2018-02-09 RX ADMIN — OXYCODONE HYDROCHLORIDE 5 MG: 5 TABLET ORAL at 07:35

## 2018-02-09 RX ADMIN — SODIUM CHLORIDE 125 ML/HR: 900 INJECTION, SOLUTION INTRAVENOUS at 04:24

## 2018-02-09 RX ADMIN — POLYETHYLENE GLYCOL 3350 17 G: 17 POWDER, FOR SOLUTION ORAL at 09:05

## 2018-02-09 RX ADMIN — OXYCODONE HYDROCHLORIDE 5 MG: 5 TABLET ORAL at 20:46

## 2018-02-09 RX ADMIN — OXYCODONE HYDROCHLORIDE 5 MG: 5 TABLET ORAL at 13:56

## 2018-02-09 RX ADMIN — ACETAMINOPHEN 500 MG: 500 TABLET ORAL at 17:34

## 2018-02-09 RX ADMIN — OXYCODONE HYDROCHLORIDE 5 MG: 5 TABLET ORAL at 10:49

## 2018-02-09 RX ADMIN — DOCUSATE SODIUM AND SENNOSIDES 1 TABLET: 8.6; 5 TABLET, FILM COATED ORAL at 09:04

## 2018-02-09 RX ADMIN — OXYCODONE HYDROCHLORIDE 5 MG: 5 TABLET ORAL at 01:30

## 2018-02-09 RX ADMIN — DOCUSATE SODIUM AND SENNOSIDES 1 TABLET: 8.6; 5 TABLET, FILM COATED ORAL at 17:34

## 2018-02-09 RX ADMIN — ACETAMINOPHEN 500 MG: 500 TABLET ORAL at 13:56

## 2018-02-09 RX ADMIN — ASPIRIN 325 MG: 325 TABLET, DELAYED RELEASE ORAL at 20:46

## 2018-02-09 RX ADMIN — ASPIRIN 325 MG: 325 TABLET, DELAYED RELEASE ORAL at 09:04

## 2018-02-09 RX ADMIN — Medication 10 ML: at 22:00

## 2018-02-09 RX ADMIN — ACETAMINOPHEN 500 MG: 500 TABLET ORAL at 22:00

## 2018-02-09 RX ADMIN — OXYCODONE HYDROCHLORIDE 5 MG: 5 TABLET ORAL at 17:34

## 2018-02-09 RX ADMIN — CETIRIZINE HYDROCHLORIDE 10 MG: 10 TABLET, FILM COATED ORAL at 09:05

## 2018-02-09 RX ADMIN — ACETAMINOPHEN 500 MG: 500 TABLET ORAL at 09:05

## 2018-02-09 RX ADMIN — OXYCODONE HYDROCHLORIDE 5 MG: 5 TABLET ORAL at 23:59

## 2018-02-09 NOTE — PROGRESS NOTES
JOVITA spoke with Gilda with [facility]. They are on delay until the 19th for PT and cannot see for nursing until next Tuesday. CRM sent referral to [facility] via [employer]. Will follow. [facility] accepted the case.  SCARLETT

## 2018-02-09 NOTE — PROGRESS NOTES
Problem: Mobility Impaired (Adult and Pediatric)  Goal: *Acute Goals and Plan of Care (Insert Text)  Physical Therapy Goals  Initiated 2/8/2018    1. Patient will move from supine to sit and sit to supine  and scoot up and down in bed with supervision/set-up within 4 days. 2. Patient will perform sit to stand with supervision/set-up within 4 days. 3. Patient will ambulate with supervision/set-up for 200 feet with the least restrictive device within 4 days. 4. Patient will ascend/descend 6 stairs with one handrail(s) with supervision/set-up within 4 days. 5. Patient will perform home exercise program per protocol with independence within 4 days. 6. Patient will demonstrate AROM 0-90 degrees in operative joint within 4 days. physical Therapy SARA ROMAN  Patient: Denise Tinoco (30 y.o. female)  Date: 2/9/2018  Diagnosis: OA RIGHT KNEE  Primary osteoarthritis of one knee, right <principal problem not specified>  Procedure(s) (LRB):  RIGHT TOTAL KNEE ARTHROPLASTY  (SPINAL WITH IV SED) (Right) 1 Day Post-Op  Precautions: WBAT  Chart, physical therapy assessment, plan of care and goals were reviewed. ASSESSMENT:  Pt received supine in bed with  present. Pt agreeable to working with therapy this morning. Reviewed supine exercises prior to sitting up and pt demonstrating good command following with verbal cues for proper performance. Pain with heel slides and unable to perform SAQ without assist. Supervision for bed mobility, CGA to stand(bed elevated), and CGA to ambulated with RW. Pt demonstrating step through gait pattern however decreased step length bilaterally. Decreased weight bearing through R LE d/t pain and minimal knee flexion noted. Pt hesitant to sit up in chair d/t knee flexing however with encouragement and education on importance of gaining flexion motion pt agreeable to try and sit up through lunch. Tray table set up and all needs in place. Pt making steady progress.  Anticipate will be cleared for discharge tomorrow with HHPT follow up. Progression toward goals:  [x]      Improving appropriately and progressing toward goals  []      Improving slowly and progressing toward goals  []      Not making progress toward goals and plan of care will be adjusted     PLAN:  Patient continues to benefit from skilled intervention to address the above impairments. Continue treatment per established plan of care. Discharge Recommendations:  Home Health  Further Equipment Recommendations for Discharge:  None     SUBJECTIVE:   Patient stated Oh I don;t think I can bend it.     OBJECTIVE DATA SUMMARY:   Critical Behavior:  Neurologic State: Alert  Orientation Level: Oriented X4  Cognition: Appropriate decision making  Safety/Judgement: Good awareness of safety precautions  Range of Motion:                          Functional Mobility Training:  Bed Mobility:     Supine to Sit: Supervision              Transfers:  Sit to Stand: Contact guard assistance (from elevataed bed height)  Stand to Sit: Contact guard assistance; Additional time                             Balance:  Sitting: Intact  Standing: Impaired  Standing - Static: Good;Constant support  Standing - Dynamic : Good (RW)  Ambulation/Gait Training:  Distance (ft): 100 Feet (ft)  Assistive Device: Gait belt;Walker, rolling  Ambulation - Level of Assistance: Contact guard assistance        Gait Abnormalities: Antalgic;Decreased step clearance           Stance: Right decreased  Speed/Irena: Slow  Step Length: Right shortened;Left shortened                 Therapeutic Exercises:     EXERCISE   Sets   Reps   Active Active Assist   Passive Self ROM   Comments   Ankle Pumps 1 6 [x]                                        []                                        []                                        []                                           Quad Sets 1 6 [x]                                        []                                        [] []                                           Hamstring Sets   []                                        []                                        []                                        []                                           Short Arc Quads 1 3 []                                        [x]                                        []                                        []                                           Knee Extension Stretch     []                                          []                                          []                                          []                                           Heel Slides 1 5 [x]                                        []                                        []                                        []                                           Long Arc Quads   []                                        []                                        []                                        []                                           Knee Flexion Stretch   []                                        []                                        []                                        []                                           Straight Leg Raises   []                                        []                                        []                                        []                                             Activity Tolerance:   Good  Please refer to the flowsheet for vital signs taken during this treatment.   After treatment:   [x] Patient left in no apparent distress sitting up in chair  [] Patient left in no apparent distress in bed  [x] Call bell left within reach  [x] Nursing notified  [x] Caregiver present  [] Bed alarm activated    COMMUNICATION/COLLABORATION:   The patients plan of care was discussed with: Registered Nurse    Leon Bojorquez PT   Time Calculation: 25 mins

## 2018-02-09 NOTE — PROGRESS NOTES
This patient is NOT a bundle due to observation status. KJT    The patient has been upgraded to in patient. They are now Bundle.  kJT

## 2018-02-09 NOTE — PROGRESS NOTES
Care Management Interventions  PCP Verified by CM: Yes (Dr. Chauhan Nurse)  Palliative Care Criteria Met (RRAT>21 & CHF Dx)?: No  Mode of Transport at Discharge: Self  Transition of Care Consult (CM Consult): [address]: No  Reason Outside [place]: Patient already serviced by other home care/hospice agency (used [facility] back in Jan. chose them again. )  MyChart Signup: No  Discharge Durable Medical Equipment: No (has RW at home)  Physical Therapy Consult: Yes  Occupational Therapy Consult: Yes  Speech Therapy Consult: No  Current Support Network: Lives with Spouse  Confirm Follow Up Transport: Family  Plan discussed with Pt/Family/Caregiver: Yes  Freedom of Choice Offered: Yes  [place] Resource Information Provided?:  (N/A)  Discharge Location  Discharge Placement: Home with home health    Home care orders noted. CRM met with the patient and her  and offered agency choice. The patient chose [facility]. She used them back in Jan. CRM sent referral via [employer]. The patient is a Medicare Bundle. Will follow.  Nura

## 2018-02-09 NOTE — OP NOTES
[facility] OP NOTE    America Mcintyre  MR#: [id number]  : 1950  ACCOUNT #: [de-identified]   DATE OF SERVICE: 2018    SURGEON:  Lan Santos MD.     FIRST ASSISTANT:  Josue Silverio. PREOPERATIVE DIAGNOSIS:  Osteoarthritis of the right knee. POSTOPERATIVE DIAGNOSIS:  Osteoarthritis of the right knee. PROCEDURE PERFORMED:  Right total knee arthroplasty. IMPLANTS:  Leon Persona size 7 femur, size E tibia with 10 mm posterior stabilized polyethylene insert and 32 mm patella. ANESTHESIA:  Spinal with sedation, as well as adductor canal block. COMPLICATIONS:  None. ESTIMATED BLOOD LOSS:  100 mL. SPECIMENS REMOVED:  None. INDICATIONS:  The patient is a 69-year-old female with progressive right knee pain due to severe osteoarthritis. Symptoms have progressed despite comprehensive conservative treatment. She presents for right total knee replacement. Risks, benefits, alternatives of procedure were reviewed with her in detail. She desires to proceed. PROCEDURE IN DETAIL:  The anesthesia team placed an adductor canal block before the patient was taken to the operating room and they also placed a spinal. Preoperative IV antibiotics were administered. Jimenes catheter was inserted and a padded pneumatic tourniquet was placed on the right upper thigh. Right lower extremity was prepped and draped in the usual sterile fashion. Tourniquet was inflated to 275 through a midline anterior knee incision. I performed a medial parapatellar arthrotomy. Minimal medial release was performed during exposure. I took 10 mm off the distal femur using a 5 degree distal femoral cutting block over a long intramedullary krishna. The femur was sized to a 7 and prepared for a size 7 posterior stabilized component utilizing appropriate jigs, rotational alignment, which was gauged off of Whitesides line, as well as the transepicondylar axis.   Neutral proximal tibial resection was performed. The menisci were excised and posterior osteophytes were removed from the femur. Subperiosteal posterior capsular release was performed. Minimal medial release was required to produce symmetrical and equal flexion and extension gaps with a 10 mm spacer block. Trials were inserted with the 10 mm insert and placed the knee in full extension to gravity. Satisfactory coronal plane stability and soft tissue tension throughout arc of motion. Patella was prepared for a 32 mm component and tracked centrally using no thumbs technique. Trials were removed and bony surfaces were copiously irrigated by pulse lavage and dried before the real components were cemented into place using antibiotic impregnated cement. Excess cement was removed and knee was reduced in full extension with the real insert in place during cement setup. Periarticular soft tissues were injected with solution containing Exparel, 0.5% Marcaine with epinephrine, as well as morphine and Toradol. Tourniquet was released and hemostasis obtained with the Bovie. Wound was irrigated. The arthrotomy was closed with a combination of heavy Vicryl sutures and a running #2 Stratafix suture. Skin and subcutaneous layers were closed in layered fashion with Vicryl and a running Monocryl subcuticular stitch. Wound was dressed with Dermabond and an Aquacel occlusive dressing, as well as a sterile compressive dressing. The patient was transported to the postanesthesia care unit in stable condition. All counts were correct at the end of the procedure.       MD Christine Cabrera / Arline Wing  D: 02/08/2018 21:52     T: 02/09/2018 10:04  JOB #: [id number]  CC: Pratibha Patel MD

## 2018-02-09 NOTE — PROGRESS NOTES
Bedside shift change report given to Alireza Bowman (oncoming nurse) by Srinivasa Burnette (offgoing nurse). Report included the following information SBAR, Kardex, Intake/Output and MAR.

## 2018-02-09 NOTE — PROGRESS NOTES
Problem: Mobility Impaired (Adult and Pediatric)  Goal: *Acute Goals and Plan of Care (Insert Text)  Physical Therapy Goals  Initiated 2/8/2018    1. Patient will move from supine to sit and sit to supine  and scoot up and down in bed with supervision/set-up within 4 days. 2. Patient will perform sit to stand with supervision/set-up within 4 days. 3. Patient will ambulate with supervision/set-up for 200 feet with the least restrictive device within 4 days. 4. Patient will ascend/descend 6 stairs with one handrail(s) with supervision/set-up within 4 days. 5. Patient will perform home exercise program per protocol with independence within 4 days. 6. Patient will demonstrate AROM 0-90 degrees in operative joint within 4 days. physical Therapy TREATMENT  Patient: Oziel Bahena (89 y.o. female)  Date: 2/9/2018  Diagnosis: OA RIGHT KNEE  Primary osteoarthritis of one knee, right  Osteoarthritis of right knee <principal problem not specified>  Procedure(s) (LRB):  RIGHT TOTAL KNEE ARTHROPLASTY  (SPINAL WITH IV SED) (Right) 1 Day Post-Op  Precautions: WBAT  Chart, physical therapy assessment, plan of care and goals were reviewed. ASSESSMENT:  Pt making steady progress towards goals. On track for discharge after one additional session for stair training tomorrow. Pt ambulated 300ft with RW and increased speed and foot clearance. Pt unable to complete SLR in supine however demod good SAQ and heel slide. Returned to supine at end of session. Pt tolerated treatment well and has a very supportive family at home. Progression toward goals:  [x]      Improving appropriately and progressing toward goals  []      Improving slowly and progressing toward goals  []      Not making progress toward goals and plan of care will be adjusted     PLAN:  Patient continues to benefit from skilled intervention to address the above impairments. Continue treatment per established plan of care.   Discharge Recommendations:  Home Health  Further Equipment Recommendations for Discharge:  None     SUBJECTIVE:   I thought you were going to give me a pass for the afternoon    OBJECTIVE DATA SUMMARY:   Range of Motion:            0-90              Functional Mobility Training:  Bed Mobility:     Supine to Sit: Stand-by asssistance  Sit to Supine: Stand-by asssistance           Transfers:  Sit to Stand: Stand-by asssistance  Stand to Sit: Stand-by asssistance                             Ambulation/Gait Training:  Distance (ft): 300 Feet (ft)  Assistive Device: Gait belt;Walker, rolling  Ambulation - Level of Assistance: Stand-by asssistance        Gait Abnormalities: Decreased step clearance; Antalgic           Stance: Right decreased  Speed/Irena: Pace decreased (<100 feet/min)  Step Length: Right shortened;Left shortened                  Therapeutic Exercises:   Exercises performed per protocol. See morning treatment note for description. Activity Tolerance:   Good  Please refer to the flowsheet for vital signs taken during this treatment.   After treatment:   [] Patient left in no apparent distress sitting up in chair  [x] Patient left in no apparent distress in bed  [x] Call bell left within reach  [x] Nursing notified  [] Caregiver present  [] Bed alarm activated    COMMUNICATION/COLLABORATION:   The patients plan of care was discussed with: Registered Nurse    Dayan Meraz PT   Time Calculation: 22 mins

## 2018-02-09 NOTE — PROGRESS NOTES
Mod pain.  No cp/sob.     Visit Vitals    /55 (BP 1 Location: Right arm, BP Patient Position: At rest)    Pulse 64    Temp 99.2 °F (37.3 °C)    Resp 15    Ht 5' 6.5\" (1.689 m)    Wt 76.2 kg (168 lb)    SpO2 96%    BMI 26.71 kg/m2          R knee dressing dry  Calves soft NT  Motor 5/5  Pulses symmetrical    Recent Results (from the past 12 hour(s))   METABOLIC PANEL, BASIC    Collection Time: 02/09/18  3:45 AM   Result Value Ref Range    Sodium 145 136 - 145 mmol/L    Potassium 4.1 3.5 - 5.1 mmol/L    Chloride 115 (H) 97 - 108 mmol/L    CO2 25 21 - 32 mmol/L    Anion gap 5 5 - 15 mmol/L    Glucose 97 65 - 100 mg/dL    BUN 16 6 - 20 MG/DL    Creatinine 0.99 0.55 - 1.02 MG/DL    BUN/Creatinine ratio 16 12 - 20      GFR est AA >60 >60 ml/min/1.73m2    GFR est non-AA 56 (L) >60 ml/min/1.73m2    Calcium 8.6 8.5 - 10.1 MG/DL   HEMOGLOBIN    Collection Time: 02/09/18  3:45 AM   Result Value Ref Range    HGB 11.8 11.5 - 16.0 g/dL           POD 1 R TKA  -PT  -pain control  -ASA  -anticipate disch home tomorrow      Adam Lynne MD

## 2018-02-09 NOTE — PROGRESS NOTES
Bedside shift change report given to Francine Erickson RN (oncoming nurse) by Lou Day (offgoing nurse). Report included the following information SBAR, Kardex, Intake/Output, MAR and Recent Results.

## 2018-02-10 VITALS
HEIGHT: 67 IN | DIASTOLIC BLOOD PRESSURE: 54 MMHG | HEART RATE: 69 BPM | WEIGHT: 168 LBS | TEMPERATURE: 98.3 F | RESPIRATION RATE: 16 BRPM | OXYGEN SATURATION: 96 % | BODY MASS INDEX: 26.37 KG/M2 | SYSTOLIC BLOOD PRESSURE: 103 MMHG

## 2018-02-10 LAB — HGB BLD-MCNC: 11.9 G/DL (ref 11.5–16)

## 2018-02-10 PROCEDURE — 97116 GAIT TRAINING THERAPY: CPT

## 2018-02-10 PROCEDURE — 85018 HEMOGLOBIN: CPT | Performed by: ORTHOPAEDIC SURGERY

## 2018-02-10 PROCEDURE — 74011250637 HC RX REV CODE- 250/637: Performed by: ORTHOPAEDIC SURGERY

## 2018-02-10 PROCEDURE — 36415 COLL VENOUS BLD VENIPUNCTURE: CPT | Performed by: ORTHOPAEDIC SURGERY

## 2018-02-10 RX ORDER — OXYCODONE HYDROCHLORIDE 5 MG/1
2.5-5 TABLET ORAL
Qty: 70 TAB | Refills: 0 | Status: SHIPPED | OUTPATIENT
Start: 2018-02-10 | End: 2021-02-09

## 2018-02-10 RX ORDER — ASPIRIN 325 MG
325 TABLET, DELAYED RELEASE (ENTERIC COATED) ORAL 2 TIMES DAILY
Qty: 60 TAB | Refills: 0 | Status: SHIPPED | OUTPATIENT
Start: 2018-02-10 | End: 2022-04-22 | Stop reason: ALTCHOICE

## 2018-02-10 RX ADMIN — ASPIRIN 325 MG: 325 TABLET, DELAYED RELEASE ORAL at 09:16

## 2018-02-10 RX ADMIN — Medication 10 ML: at 05:59

## 2018-02-10 RX ADMIN — OXYCODONE HYDROCHLORIDE 5 MG: 5 TABLET ORAL at 09:16

## 2018-02-10 RX ADMIN — OXYCODONE HYDROCHLORIDE 5 MG: 5 TABLET ORAL at 05:59

## 2018-02-10 RX ADMIN — ACETAMINOPHEN 500 MG: 500 TABLET ORAL at 05:59

## 2018-02-10 RX ADMIN — DOCUSATE SODIUM AND SENNOSIDES 1 TABLET: 8.6; 5 TABLET, FILM COATED ORAL at 09:16

## 2018-02-10 RX ADMIN — CETIRIZINE HYDROCHLORIDE 10 MG: 10 TABLET, FILM COATED ORAL at 09:16

## 2018-02-10 RX ADMIN — ACETAMINOPHEN 500 MG: 500 TABLET ORAL at 09:16

## 2018-02-10 RX ADMIN — OXYCODONE HYDROCHLORIDE 5 MG: 5 TABLET ORAL at 02:56

## 2018-02-10 RX ADMIN — POLYETHYLENE GLYCOL 3350 17 G: 17 POWDER, FOR SOLUTION ORAL at 09:17

## 2018-02-10 NOTE — PROGRESS NOTES
[address]  SBAR Bundled Payment Handoff     FROM:                                TO: At [facility]                                                      ([address] [place] [address] or Facility name)  [address]  [place] [address] [address]  Dept: [address]: [phone]                                      Room#:  [id number]                                                      Discharging Nurse:  Polly Alaniz RN  Unit JA#:[phone]         SITUATION      ASAScore: ASA 2 - Patient with mild systemic disease with no functional limitations    Admitted:  2/8/2018  Hospital Day: 3      Attending Provider:  Veronica Guerrero MD     Consultations:  None    PCP:  Shannon Yusuf MD   [phone]     Admitting Dx:  OA RIGHT KNEE  Primary osteoarthritis of one knee, right  Osteoarthritis of right knee       Active Problems:    Primary osteoarthritis of one knee, right (2/8/2018)      Osteoarthritis of right knee (2/9/2018)      2 Days Post-Op of   Procedure(s):  RIGHT TOTAL KNEE ARTHROPLASTY  (SPINAL WITH IV SED)   BY: Veronica Guerrero MD             ON: 2/8/2018                  Code Status: Full Code             Advance Directive? Received (Send w/patient)     Isolation:  There are currently no Active Isolations       MDRO: No current active infections    BACKGROUND     Allergies:   Allergies   Allergen Reactions    Prednisone Other (comments)     Effects pts vision permanentely    Augmentin [Amoxicillin-Pot Clavulanate] Rash     Rash of lips    Entex [Phenylephrine-Guaifenesin] Rash     Heart racing    Levofloxacin Myalgia     Knees hurt    Lotemax [Loteprednol Etabonate] Other (comments)     headaches    Omnicef [Cefdinir] Rash     Mouth blisters     Other Plant, Animal, Environmental Other (comments)     Styrofoam cups, blisters to mouth    Penicillin G Rash     Rash on lips    Scallops Nausea and Vomiting     And sensitive skin    Shellfish Derived Nausea and Vomiting Scallops only       Past Medical History:   Diagnosis Date    Arthritis     osteo    Cancer ([facility] [address]     squamous cell on nose lt side    Chronic kidney disease     seeing dr alvarez for overactive bladder/ kidneys dr Haylee Mackey    Chronic tubulointerstitial nephritis     Erythema nodosum     RIGHT LEG AND FOOT    History of shingles     Ill-defined condition     possible tubulointerstitual disease    Ill-defined condition     raynaud's phenomenon    Ill-defined condition     erythema nodosum rt leg and foot    Ill-defined condition     plantar fiascitis    Ill-defined disease     lickens planus lt side of tongue    Psychiatric disorder     depression    Raynaud's phenomenon (by history or observed)     Sjogren's disease ([facility] [address]     Tinnitus        Past Surgical History:   Procedure Laterality Date    ABDOMEN SURGERY PROC UNLISTED  1/27/15    DAVINCI RIGHT ADRENALECTOMY and CHOLECYSTECOMTY    HX APPENDECTOMY      HX CATARACT REMOVAL Bilateral 2016    HX CHOLECYSTECTOMY  2015    HX COLONOSCOPY  2008    HX GYN      3 vaginal delivery    HX HYSTERECTOMY      HX KNEE REPLACEMENT  01/2017    LEFT TOTAL KNEE REPLACEMENT     HX ORTHOPAEDIC  2003    right knee 2003- TORN CARTILAGE REPAIR, ARTHROSCOPIC    HX OTHER SURGICAL      STRESS TEST IN 2010 - NORMAL PER PATIENT    HX UROLOGICAL      RIGHT ADRENAL MASS        Prior to Admission Medications   Prescriptions Last Dose Informant Patient Reported? Taking? Biotin 2,500 mcg cap 2/1/2018 at Unknown time  Yes Yes   Sig: Take 1,000 mcg by mouth daily. MINERAL OIL, LIGHT/MINERAL OIL (SOOTHE XP OP) 2/8/2018 at 0600  Yes Yes   Sig: Apply  to eye.    OTHER 2/1/2018 at Unknown time  Yes Yes   Sig: USES 1 PROMISE EASY TEARS- HAS VITAMIN A, D, E AND FISH OIL IN IT.   OTHER,NON-FORMULARY, 2/1/2018 at Unknown time  Yes Yes   Sig: Eye promise 2 tabs daily   acetaminophen (TYLENOL EXTRA STRENGTH) 500 mg tablet 2/7/2018 at 1900  Yes Yes   Sig: Take 1,000 mg by mouth two (2) times a day. artificial tears,hypromellose, (GENTEAL MODERATE) 0.3 % drop 2/7/2018 at 1900  Yes Yes   Sig: Administer  to both eyes as needed. carboxymethylcellulose sodium (REFRESH LIQUIGEL) 1 % dlgl 2/7/2018 at 2100  Yes Yes   Sig: Apply 1 drop to eye four (4) times daily as needed. cycloSPORINE (RESTASIS) 0.05 % ophthalmic emulsion 2/7/2018 at 0900  Yes Yes   Sig: Administer 1 drop to both eyes two (2) times a day. fexofenadine (ALLEGRA) 180 mg tablet 2/7/2018 at 0900  Yes Yes   Sig: Take 180 mg by mouth daily. fluticasone (FLONASE) 50 mcg/actuation nasal spray 2/7/2018 at 0900  Yes Yes   Sig: nightly. mirabegron (MYRBETRIQ) 25 mg tablet 2/7/2018 at 0900  Yes Yes   Sig: Take  by mouth daily. multivitamin (ONE A DAY) tablet 2/1/2018 at Unknown time  Yes Yes   Sig: Take 1 tablet by mouth daily. traMADol (ULTRAM) 50 mg tablet 2/7/2018 at 1900  Yes Yes   Sig: Take 50 mg by mouth two (2) times a day. Facility-Administered Medications: None       Vaccinations:    Immunization History   Administered Date(s) Administered    Influenza Vaccine 10/27/2014    Pneumococcal Vaccine (Unspecified Type) 10/27/2014         ASSESSMENT   Age: 69 y.o.              Gender: female        Height: Height: 5' 6.5\" (168.9 cm)                    Weight:Weight: 76.2 kg (168 lb)     Patient Vitals for the past 8 hrs:   Temp Pulse Resp BP SpO2   02/10/18 0912 98.3 °F (36.8 °C) 69 16 103/54 96 %   02/10/18 0459 98.7 °F (37.1 °C) 62 16 110/64 96 %            Active Orders   Diet    DIET REGULAR       Orientation: Orientation Level: Appropriate for age, Oriented X4    Active Lines/Drains:  (Peg Tube / Jimenes / CL or S/L?):no    Urinary Status: Voiding      Last BM: Last Bowel Movement Date: 02/08/18     Skin Integrity: Incision (comment) (Right knee)   Wound Knee Right-DRESSING STATUS: Clean, dry, and intact    Wound Knee Right-DRESSING TYPE: Silver products    Mobility: Slightly limited   Weight Bearing Status: WBAT (Weight Bearing as Tolerated)      Gait Training  Assistive Device: Walker, rolling, Gait belt  Ambulation - Level of Assistance: Supervision  Distance (ft): 120 Feet (ft)  Stairs - Level of Assistance: Contact guard assistance  Number of Stairs Trained: 4 (x2)  Rail Use: Left  (cane R)     On Anticoagulation? YES  Aspirin                                      Last dose:  2/10/18 at 0916    Pain Medications given:  Oxy 5mg                                Last dose: 2/10/2018 at  0916    Lab Results   Component Value Date/Time    Glucose 97 02/09/2018 03:45 AM    Hemoglobin A1c 5.2 01/17/2018 08:47 AM    INR 1.0 01/17/2018 08:47 AM    HGB 11.9 02/10/2018 03:04 AM    HGB 11.8 02/09/2018 03:45 AM    HGB 14.0 01/17/2018 08:47 AM    HGB 15.2 09/24/2015 09:20 AM       Readmission Risks:  Score: 17      RECOMMENDATION     See After Visit Summary (AVS) for:  · Discharge instructions  · After [address]   · Medication Reconciliation          [address] [place] [address] Orthopaedic Nurse Navigator  ERIKA Dillon, RN-BC       Office  [phone]  Cell      [phone]  Fax      [phone]  [email or web address]

## 2018-02-10 NOTE — DISCHARGE INSTRUCTIONS
Patient meets criteria for   BUNDLED PAYMENT   for Care Improvement Initiative Criteria    Contact Information for Orthopedic Nurse Navigator:      Reyna Leventhal, BSN, RN-BC  U:[phone]  Y:[phone]  N:[phone]    After Hospital Care Plan:  Discharge Instructions Knee Replacement- Dr. Andrea Cruz    Patient Name: Kraig Ramirez  Date of procedure: 2/8/2018   Procedure: Procedure(s):  RIGHT TOTAL KNEE ARTHROPLASTY  (SPINAL WITH IV SED)  Surgeon: Saint Bells) and Role:     * Leslie Bukc MD - Primary  PCP: Clay Alexis MD  Date of discharge: No discharge date for patient encounter. Follow up appointments   Follow up with Dr. Andrea Cruz in 3 weeks. Call [phone] to make an appointment.  If home health has been arranged for you the agency will contact you to arrange dates/times for visits. Please call them if you do not hear from them within 24 hours after you are discharged    When to call your Orthopaedic Surgeon: Call [phone]. If you call after 5pm or on a weekend, the on call physician will be contacted   Unrelieved pain   Signs of infection-if your incision is red; continues to have drainage; drainage has a foul odor or if you have a persistent fever over 101 degrees   Signs of a blood clot in your leg-calf pain, tenderness, redness, swelling of lower leg    When to call your Primary Care Physician:   Concerns about medical conditions such as diabetes, high blood pressure, asthma, congestive heart failure   Call if blood sugars are elevated, persistent headache or dizziness, coughing or congestion, constipation or diarrhea, burning with urination, abnormal heart rate    When to call [id number] go to the nearest emergency room   Acute onset of chest pain, shortness of breath, difficulty breathing      Activity   Weight bearing as tolerated with walker or crutches.  Refer to pages 23-31 of your handbook for instructions and pictures   Complete your Home Exercise Program daily as instructed by your therapist.  Refer to pages 33-41 of your handbook for instructions and pictures   Get up every one hour and walk (except at night when sleeping)   Do not drive or operate heavy machinery      Incision Care   The Aquacel (brown, waterproof) surgical dressing is to remain on your knee for 7 days. On the 7th day have someone gently peel the dressing off by carefully lifting the edge and stretching it slightly to break the adhesive seal   If you have steri-strips (small, white pieces of tape) on your incision, they may come off when you remove the Aquacel surgical dressing. This is okay. You may now leave your incision open to air   If your Aquacel dressing comes loose/off before the 7th day, you may replace it with a dry sterile gauze dressing; change it daily. Once you incision is not draining, you may leave it open to air   You may take a shower with the Aquacel dressing in place. Once the Aquacel is removed, you may shower and get your incision wet but do not submerge your incision under water in a bath tub, hot tub or swimming pool for 6 weeks after surgery. Preventing blood clots    Take Enteric coated Aspirin (delayed release) one tablet twice a day for one month following surgery    Pain management   Take pain medication as prescribed with food and fluids; decrease the amount you use as your pain lessens   Avoid alcoholic beverages while taking pain medication   Please be aware that many medications contain Tylenol (acetaminophen). We do not want you to over medicate so please read the information below as a guide. Do not take more than 3 Grams of Tylenol in a 24 hour period.   (There are 1000 milligrams in one Gram)   o Tylenol 325 mg per tablet (do not take more than 9 tablets in 24 hours)  o Tylenol 500 mg per tablet (do not take more than 6 tablets in 24 hours)  o Tylenol 650 mg per tablet (do not take more than 4 tablets in 24 hours)   Elevate your leg (do not bend/flex knee) and place ice bags on your knee for 15-20 minutes after exercising and as needed for comfort    Diet   Resume usual diet; drink plenty of fluids; eat foods high in fiber   You may want to take a stool softener (such as Senokot-S or Colace) to prevent constipation while you are taking pain medication. If constipation occurs, take a laxative (such as Dulcolax tablets, Milk of Magnesia, or a suppository)      [facility] Protocol (to be followed by [address])  Nursing-per physicians order   Complete head to toe assessment, vital signs   Medication reconciliation   Review pain management   Manage chronic medical conditions    Physical Therapy-per physicians order  Weight bearing status:  Precautions at Admission: WBAT     Right Side Weight Bearing: As tolerated    Mobility Status:  Supine to Sit: Stand-by asssistance  Sit to Stand: Stand-by asssistance  Sit to Supine: Stand-by asssistance     Gait:  Distance (ft): 300 Feet (ft)  Ambulation - Level of Assistance: Stand-by asssistance  Assistive Device: Gait belt, Walker, rolling  Gait Abnormalities: Decreased step clearance, Antalgic    ADL status overall composite:                Physical Therapy   Assessment and evaluation-bed mobility; functional transfers (bed, chair, bathroom, stairs); ambulation with equipment, car transfers, shower transfers, safety and ability to get out of house in the event of an emergency   Review weight bearing as tolerated, wean from walker or crutches as tolerated   Discuss pain management   Review how to do ADLs. Refer to page 42 of patient handbook    Home Exercise Program-refer to pages 33-41 of patient handbook for exercises.

## 2018-02-10 NOTE — PROGRESS NOTES
Problem: Mobility Impaired (Adult and Pediatric)  Goal: *Acute Goals and Plan of Care (Insert Text)  Physical Therapy Goals  Initiated 2/8/2018    1. Patient will move from supine to sit and sit to supine  and scoot up and down in bed with supervision/set-up within 4 days. 2. Patient will perform sit to stand with supervision/set-up within 4 days. 3. Patient will ambulate with supervision/set-up for 200 feet with the least restrictive device within 4 days. 4. Patient will ascend/descend 6 stairs with one handrail(s) with supervision/set-up within 4 days. 5. Patient will perform home exercise program per protocol with independence within 4 days. 6. Patient will demonstrate AROM 0-90 degrees in operative joint within 4 days. physical Therapy TREATMENT  Patient: Oziel Bahena (87 y.o. female)  Date: 2/10/2018  Diagnosis: OA RIGHT KNEE  Primary osteoarthritis of one knee, right  Osteoarthritis of right knee <principal problem not specified>  Procedure(s) (LRB):  RIGHT TOTAL KNEE ARTHROPLASTY  (SPINAL WITH IV SED) (Right) 2 Days Post-Op  Precautions: Fall, WBAT  Chart, physical therapy assessment, plan of care and goals were reviewed. ASSESSMENT:  Patient received supine in bed, agreeable to therapy. Excellent progress towards goals and complaining of minimal pain. Able to perform stair training with max cues for sequencing (family present for education in order to best assist at home). Gait minimally antalgic and progressing to step through pattern. Reviewed HEP. D/c education provided regarding need for frequent movement at home (1x/waking hour), compliance with HEP, need for ice, and time propped in extension to encourage full ROM. Patient will benefit from HHPT at d/c.      Patient is cleared for discharge from PT standpoint:  YES [x]     NO []     Progression toward goals:  [x]      Improving appropriately and progressing toward goals  []      Improving slowly and progressing toward goals  []      Not making progress toward goals and plan of care will be adjusted     PLAN:  Patient continues to benefit from skilled intervention to address the above impairments. Continue treatment per established plan of care. Discharge Recommendations:  Home Health  Further Equipment Recommendations for Discharge: Owns RW     SUBJECTIVE:   Patient stated \"I don't know why that's hard to remember.  sequencing for stairs    OBJECTIVE DATA SUMMARY:   Critical Behavior:  Neurologic State: Alert, Appropriate for age  Orientation Level: Appropriate for age, Oriented X4  Cognition: Appropriate decision making, Appropriate for age attention/concentration, Appropriate safety awareness, Follows commands  Safety/Judgement: Good awareness of safety precautions  Functional Mobility Training:  Bed Mobility:  Supine to Sit: Modified independent  Sit to Supine: Modified independent  Transfers:  Sit to Stand: Supervision  Stand to Sit: Supervision  Balance:  Sitting: Intact  Standing: Intact; With support  Ambulation/Gait Training:  Distance (ft): 120 Feet (ft)  Assistive Device: Walker, rolling;Gait belt  Ambulation - Level of Assistance: Supervision  Gait Abnormalities: Antalgic;Decreased step clearance  Right Side Weight Bearing: As tolerated  Stance: Right decreased  Speed/Irena: Pace decreased (<100 feet/min)  Step Length: Right shortened;Left shortened  Swing Pattern: Right asymmetrical  Stairs:  Number of Stairs Trained: 4 (x2)  Stairs - Level of Assistance: Contact guard assistance  Rail Use: Left  (cane R)  Therapeutic Exercises:     EXERCISE   Sets   Reps   Active Active Assist   Passive Self ROM   Comments   Ankle Pumps 1 10 [x]                                        []                                        []                                        []                                           Quad Sets 1 10 [x]                                        []                                        [] []                                           Hamstring Sets 1 10 [x]                                        []                                        []                                        []                                           Short Arc Quads 1 10 [x]                                        []                                        []                                        []                                           Knee Extension Stretch 1 90 sec   []                                          []                                          [x]                                          []                                           Heel Slides 1 10 [x]                                        []                                        []                                        []                                             Pain:  Pain Scale 1: Numeric (0 - 10)  Pain Intensity 1: 6  Pain Location 1: Knee  Pain Orientation 1: Right  Pain Description 1: Dull;Aching  Pain Intervention(s) 1: Medication (see MAR); Repositioned  Activity Tolerance:   Good  Please refer to the flowsheet for vital signs taken during this treatment.   After treatment:   [] Patient left in no apparent distress sitting up in chair  [x] Patient left in no apparent distress in bed  [x] Call bell left within reach  [x] Nursing notified  [x] Caregiver present  [] Bed alarm activated    COMMUNICATION/COLLABORATION:   The patients plan of care was discussed with: Registered Nurse    Екатерина Hidalgo, PT, DPT   Time Calculation: 16 mins

## 2018-02-10 NOTE — ROUTINE PROCESS
Bedside shift change report given to Guillermina [place] (oncoming nurse) by Thea Suggs (offgoing nurse). Report included the following information SBAR.

## 2018-02-10 NOTE — PROGRESS NOTES
Bedside shift change report given to Rafael Ramírez (oncoming nurse) by Chad Ruiz (offgoing nurse). Report included the following information SBAR and Kardex.

## 2018-02-14 ENCOUNTER — PATIENT OUTREACH (OUTPATIENT)
Dept: OTHER | Age: 68
End: 2018-02-14

## 2018-02-14 NOTE — PROGRESS NOTES
This note will not be viewable in [address] Post Discharge Follow-up contact after Joint Replacement    Patient discharged on 2/10/18  By  Zia Pruitt   following  right knee Arthroplasty. Spoke with patient today, who reports they are \" under control! .\"  Denies Fever, Shortness of Breath or Chest Pain. Home Health has visited. Patient also reports:. Incision  clean, dry, intact  Calf is non-tender,   operative extremity has moderate swelling. Pain is well managed. Discussed use of ice & elevation. is progressing with therapy and is exercising independently. Taking Aspirin for anticoagulation, oxycodone for pain. Patient   Patient has not had a bowel movement yet following surgery. Reviewed use of stool softeners, fiber products, diet and fluid intake. Pt confirms that she is following all of those guidelines, and that her  is preparing her a \"powermix of applesauce and bran\" which she has used successfully in the past.  Discussed side effects of anticoagulants & pain medications (bleeding/bruising, constipation, lightheaded/dizziness)  Follow up appointment is not scheduled; but patient states plan to schedule. Discussed calling surgeon Dr Guillermina Moody  for drainage, bleeding, swelling in operative extremity, fever or pain. Discussed calling PCP Dr Evangelist Valencia with other medical issues.

## 2021-01-27 ENCOUNTER — TRANSCRIBE ORDER (OUTPATIENT)
Dept: SCHEDULING | Age: 71
End: 2021-01-27

## 2021-01-27 DIAGNOSIS — N18.32 STAGE 3B CHRONIC KIDNEY DISEASE (HCC): ICD-10-CM

## 2021-01-27 DIAGNOSIS — R80.9 PROTEINURIA: Primary | ICD-10-CM

## 2021-02-08 NOTE — PROGRESS NOTES
Have you been tested for COVID-19 in the past 7 days? no    Do you have a personal history of COVID-19 within the past 28 days? no  If Yes, What was the method of testing: clinical assumption or test result? Have you had close contact with a known to be positive COVID-19 patient within the past 14 days? no    Are you a healthcare worker or ? no  If Yes, have you been exposed to COVID-19 without proper PPE? Do you live in a SNF, adult home or other institutional setting? no  If Yes, have they experienced a flood of COVID-19 positive patients?     In the past 2-14 days have you had any of the following symptoms - no   Cough   New onset Shortness of breath or difficulty breathing    Or at least two of these symptoms:   Fever greater than 100 F   Chills   Repeated shaking with chills   Muscle pain   Headache   Sore throat   New loss of taste or smell   New onset diarrhea

## 2021-02-09 ENCOUNTER — HOSPITAL ENCOUNTER (OUTPATIENT)
Dept: CT IMAGING | Age: 71
Discharge: HOME | End: 2021-02-10
Attending: INTERNAL MEDICINE | Admitting: INTERNAL MEDICINE
Payer: MEDICARE

## 2021-02-09 DIAGNOSIS — N18.32 STAGE 3B CHRONIC KIDNEY DISEASE (HCC): ICD-10-CM

## 2021-02-09 DIAGNOSIS — R80.9 PROTEINURIA: ICD-10-CM

## 2021-02-09 LAB
ABO + RH BLD: NORMAL
BLOOD GROUP ANTIBODIES SERPL: NORMAL
HCT VFR BLD AUTO: 40 % (ref 35–47)
HCT VFR BLD AUTO: 40.1 % (ref 35–47)
HGB BLD-MCNC: 13.3 G/DL (ref 11.5–16)
SPECIMEN EXP DATE BLD: NORMAL

## 2021-02-09 PROCEDURE — 77030018781

## 2021-02-09 PROCEDURE — 36415 COLL VENOUS BLD VENIPUNCTURE: CPT

## 2021-02-09 PROCEDURE — 88305 TISSUE EXAM BY PATHOLOGIST: CPT

## 2021-02-09 PROCEDURE — 77030014115

## 2021-02-09 PROCEDURE — 99152 MOD SED SAME PHYS/QHP 5/>YRS: CPT

## 2021-02-09 PROCEDURE — 86901 BLOOD TYPING SEROLOGIC RH(D): CPT

## 2021-02-09 PROCEDURE — 2709999900 HC NON-CHARGEABLE SUPPLY

## 2021-02-09 PROCEDURE — 88348 ELECTRON MICROSCOPY DX: CPT

## 2021-02-09 PROCEDURE — 85014 HEMATOCRIT: CPT

## 2021-02-09 PROCEDURE — 88346 IMFLUOR 1ST 1ANTB STAIN PX: CPT

## 2021-02-09 PROCEDURE — 88350 IMFLUOR EA ADDL 1ANTB STN PX: CPT

## 2021-02-09 PROCEDURE — 74011250636 HC RX REV CODE- 250/636: Performed by: STUDENT IN AN ORGANIZED HEALTH CARE EDUCATION/TRAINING PROGRAM

## 2021-02-09 PROCEDURE — 77030003666 HC NDL SPINAL BD -A

## 2021-02-09 PROCEDURE — 77030040395 HC NDL BIOP COAX INTRO MRTM -B

## 2021-02-09 PROCEDURE — 88313 SPECIAL STAINS GROUP 2: CPT

## 2021-02-09 RX ORDER — MIDAZOLAM HYDROCHLORIDE 1 MG/ML
5 INJECTION, SOLUTION INTRAMUSCULAR; INTRAVENOUS
Status: DISCONTINUED | OUTPATIENT
Start: 2021-02-09 | End: 2021-02-09

## 2021-02-09 RX ORDER — FENTANYL CITRATE 50 UG/ML
100 INJECTION, SOLUTION INTRAMUSCULAR; INTRAVENOUS
Status: DISCONTINUED | OUTPATIENT
Start: 2021-02-09 | End: 2021-02-09

## 2021-02-09 RX ORDER — CLONIDINE HYDROCHLORIDE 0.1 MG/1
0.1 TABLET ORAL AS NEEDED
Status: DISCONTINUED | OUTPATIENT
Start: 2021-02-09 | End: 2021-02-10 | Stop reason: HOSPADM

## 2021-02-09 RX ORDER — SODIUM CHLORIDE 9 MG/ML
25 INJECTION, SOLUTION INTRAVENOUS CONTINUOUS
Status: DISCONTINUED | OUTPATIENT
Start: 2021-02-09 | End: 2021-02-09

## 2021-02-09 RX ADMIN — SODIUM CHLORIDE 25 ML/HR: 9 INJECTION, SOLUTION INTRAVENOUS at 09:52

## 2021-02-09 RX ADMIN — FENTANYL CITRATE 25 MCG: 50 INJECTION, SOLUTION INTRAMUSCULAR; INTRAVENOUS at 09:55

## 2021-02-09 RX ADMIN — MIDAZOLAM HYDROCHLORIDE 1 MG: 1 INJECTION, SOLUTION INTRAMUSCULAR; INTRAVENOUS at 09:55

## 2021-02-09 RX ADMIN — MIDAZOLAM HYDROCHLORIDE 1 MG: 1 INJECTION, SOLUTION INTRAMUSCULAR; INTRAVENOUS at 09:58

## 2021-02-09 RX ADMIN — FENTANYL CITRATE 25 MCG: 50 INJECTION, SOLUTION INTRAMUSCULAR; INTRAVENOUS at 09:58

## 2021-02-09 NOTE — H&P
Radiology History and Physical    Patient: Ajith Anderson 65 y.o. female       Chief Complaint: No chief complaint on file. History of Present Illness: Proteinuria, chronic kidney disease    History:    Past Medical History:   Diagnosis Date    Arthritis     osteo    Cancer (HCC)     squamous cell on nose lt side    Chronic kidney disease     seeing dr alvarez for overactive bladder/ kidneys dr Marlen Pinon    Chronic tubulointerstitial nephritis     Erythema nodosum     RIGHT LEG AND FOOT    History of shingles     Ill-defined condition     possible tubulointerstitual disease    Ill-defined condition     raynaud's phenomenon    Ill-defined condition     erythema nodosum rt leg and foot    Ill-defined condition     plantar fiascitis    Ill-defined disease     lickens planus lt side of tongue    Psychiatric disorder     depression    Raynaud's phenomenon (by history or observed)     Sjogren's disease ([address]     Tinnitus      Family History   Problem Relation Age of Onset    Cancer Mother         LEUKEMIA    Cancer Father         LYMPHOMA    Cancer Brother         COLON CANCER    Anesth Problems Neg Hx      Social History     Socioeconomic History    Marital status:      Spouse name: Not on file    Number of children: Not on file    Years of education: Not on file    Highest education level: Not on file   Occupational History    Not on file   Social Needs    Financial resource strain: Not on file    Food insecurity     Worry: Not on file     Inability: Not on file    Transportation needs     Medical: Not on file     Non-medical: Not on file   Tobacco Use    Smoking status: Never Smoker    Smokeless tobacco: Former User   Substance and Sexual Activity    Alcohol use:  Yes     Alcohol/week: 14.0 standard drinks     Types: 14 Shots of liquor per week     Comment: socially    Drug use: No    Sexual activity: Not on file   Lifestyle    Physical activity     Days per week: Not on file Minutes per session: Not on file    Stress: Not on file   Relationships    Social connections     Talks on phone: Not on file     Gets together: Not on file     Attends Church service: Not on file     Active member of club or organization: Not on file     Attends meetings of clubs or organizations: Not on file     Relationship status: Not on file    Intimate partner violence     Fear of current or ex partner: Not on file     Emotionally abused: Not on file     Physically abused: Not on file     Forced sexual activity: Not on file   Other Topics Concern    Not on file   Social History Narrative    Not on file       Allergies: Allergies   Allergen Reactions    Prednisone Other (comments)     Effects pts vision permanentely    Augmentin [Amoxicillin-Pot Clavulanate] Rash     Rash of lips    Entex [Phenylephrine-Guaifenesin] Rash     Heart racing    Levofloxacin Myalgia     Knees hurt    Lotemax [Loteprednol Etabonate] Other (comments)     headaches    Omnicef [Cefdinir] Rash     Mouth blisters     Other Plant, Animal, Environmental Other (comments)     Styrofoam cups, blisters to mouth    Penicillin G Rash     Rash on lips    Scallops Nausea and Vomiting     And sensitive skin    Shellfish Derived Nausea and Vomiting     Scallops only       Current Medications:  Current Outpatient Medications   Medication Sig    aspirin delayed-release 325 mg tablet Take 1 Tab by mouth two (2) times a day. For blood clot prevention.  oxyCODONE IR (ROXICODONE) 5 mg immediate release tablet Take 0.5-1 Tabs by mouth every four (4) hours as needed. Max Daily Amount: 30 mg. Indications: Pain    acetaminophen (TYLENOL) 500 mg tablet Take 1 Tab by mouth every four (4) hours (while awake). Schedule for pain control over the next 7-14 days. Do not exceed 3000 mg in 24 hours. Obtain over-the-counter.   Indications: Postoperative Incisional Knee Pain    senna-docusate (PERICOLACE) 8.6-50 mg per tablet Take 1 Tab by mouth two (2) times a day. Take with full glass of fluid. Do not take if having frequent or loose BMs. Obtain over-the-counter. Indications: Prevention of Postoperative Constipation    fexofenadine (ALLEGRA) 180 mg tablet Take 180 mg by mouth daily.  Biotin 2,500 mcg cap Take 1,000 mcg by mouth daily.  OTHER USES 1 PROMISE EASY TEARS- HAS VITAMIN A, D, E AND FISH OIL IN IT.    fluticasone (FLONASE) 50 mcg/actuation nasal spray nightly.  MINERAL OIL, LIGHT/MINERAL OIL (SOOTHE XP OP) Apply  to eye.  artificial tears,hypromellose, (GENTEAL MODERATE) 0.3 % drop Administer  to both eyes as needed.  mirabegron (MYRBETRIQ) 25 mg tablet Take  by mouth daily.  carboxymethylcellulose sodium (REFRESH LIQUIGEL) 1 % dlgl Apply 1 drop to eye four (4) times daily as needed.  OTHER,NON-FORMULARY, Eye promise 2 tabs daily    multivitamin (ONE A DAY) tablet Take 1 tablet by mouth daily.  cycloSPORINE (RESTASIS) 0.05 % ophthalmic emulsion Administer 1 drop to both eyes two (2) times a day. Current Facility-Administered Medications   Medication Dose Route Frequency    0.9% sodium chloride infusion  25 mL/hr IntraVENous CONTINUOUS    fentaNYL citrate (PF) injection 100 mcg  100 mcg IntraVENous Multiple    midazolam (PF) (VERSED) injection 5 mg  5 mg IntraVENous Multiple        Physical Exam:  Blood pressure 116/60, pulse 60, temperature 98.6 °F (37 °C), resp. rate 16, height 5' 7\" (1.702 m), weight 77.1 kg (170 lb), SpO2 97 %. GENERAL: alert, cooperative, no distress, appears stated age  LUNG: clear to auscultation bilaterally  HEART: regular rate and rhythm  ABD: Non tender, non distended.       Alerts:    Hospital Problems  Date Reviewed: 10/9/2015          Codes Class Noted POA    Proteinuria ICD-10-CM: R80.9  ICD-9-CM: 791.0  2/9/2021 Unknown              Laboratory:    No results for input(s): HGB, HCT, WBC, PLT, INR, BUN, CREA, K, CRCLT, HGBEXT, HCTEXT, PLTEXT, INREXT in the last 72 hours.    No lab exists for component: PTT, PT      Plan of Care/Planned Procedure:  Risks, benefits, and alternatives reviewed with patient and she agrees to proceed with the procedure. Deemed appropriate or moderate sedation with versed and fentanyl.       Silvano Banks MD

## 2021-02-09 NOTE — ROUTINE PROCESS
6640- Pt in for renal biopsy. Workup completed. VSS.  at bedside. 0900- Dr Jhony Menezes in to consult pt for procedure. Pt aware of risks and benefits and wishes to proceed. Consent obtained. 46- Dr Theo Vargas called and updated of pt status. 65- Dr Theo Vargas in to consult pt. 1230- Pt debby lunch. Attempted to void on bedpan w/out success. 1400- VSS.  at bedside. Report given to primary nurse Radha Galvan. Orders, medications used reviewed with pt. Pt to room per transport.

## 2021-02-09 NOTE — DISCHARGE INSTRUCTIONS
[address]  Special Procedures/Radiology Department    Radiologist: Ana Paula Astudillo    Date:  2/    Kidney Biopsy Discharge Instructions    Rest for the next 24 to 48 hours.  /  Salo Tiffanie may have a small amount of blood in your urine which will make your urine a pink color. If you have any sergio red blood or blood clots in your urine, go to the nearest Emergency Room. Other symptoms that require immediate medical attention are severe pain in your kidney area, sweatiness, clammy skin, vomiting of fever. Because you received sedation, you are not to drive or sign any legal documents for the next 24 hours. Resume your previous diet and follow the medication reconciliation form. You may take Tylenol, as directed on the label, for pain or discomfort. Avoid ibuprofen (Advil, Motrin) and aspirin as they may cause you to bleed. You may return to work without restrictions in 48 hours. Follow up with your physician for the test results. It may take up to 5 days for the test results to become available to your physician. If you have not heard anything after 5 business days, call your physician. If you have any questions or concerns, please call [phone] and ask to speak to the nurse on-call.     Other:

## 2021-02-09 NOTE — H&P
H+P Note    NAME: Yamila Puente   :  1950   MRN:  [id number]     Date/Time:  2021 2:08 PM         Assessment :    Plan:  CKD-3b - Dr. Raji Fletcher - creatinine 1.5  Proteinuria - 6960-7638 mg  sjogren's S/P renal biopsy this AM.  SBP stable. On no meds. Tylenol for pain. Regular diet. Bed rest for 6 hours and then H/H overnight. Hopeful DC in AM.       Subjective:   CHIEF COMPLAINT:  \"I did fine. \"    HISTORY OF PRESENT ILLNESS:     Nirmal Mcdermott is a 65 y.o.   female who has a history of CKD-3b followed by Dr. Raji Fletcher in obs s/P renal biopsy. I reviewed records that show the patient's creatinine has been about 1.5 and she has had proteinuria. She has no h/o HTN or DM and is therefore for biopsy. She says that she feels fine. No flank pain. No dizziness.      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    Chronic kidney disease     seeing dr alvarez for overactive bladder/ kidneys dr Christine Carroll    Chronic tubulointerstitial nephritis     Erythema nodosum     RIGHT LEG AND FOOT    History of shingles     Ill-defined condition     possible tubulointerstitual disease    Ill-defined condition     raynaud's phenomenon    Ill-defined condition     erythema nodosum rt leg and foot    Ill-defined condition     plantar fiascitis    Ill-defined disease     lickens planus lt side of tongue    Psychiatric disorder     depression    Raynaud's phenomenon (by history or observed)     Sjogren's disease ([facility] [address]     Tinnitus       Past Surgical History:   Procedure Laterality Date    ABDOMEN SURGERY PROC UNLISTED  1/27/15    DAVINCI RIGHT ADRENALECTOMY and CHOLECYSTECOMTY    HX APPENDECTOMY      HX CATARACT REMOVAL Bilateral 2016    HX CHOLECYSTECTOMY  2015    HX COLONOSCOPY  2008    HX GYN      3 vaginal delivery    HX HYSTERECTOMY      HX KNEE REPLACEMENT  2017    LEFT TOTAL KNEE REPLACEMENT     HX ORTHOPAEDIC  2003    right knee 2003- TORN CARTILAGE REPAIR, ARTHROSCOPIC   • HX OTHER SURGICAL      STRESS TEST IN 2010 - NORMAL PER PATIENT   • HX UROLOGICAL      RIGHT ADRENAL MASS      Social History     Tobacco Use   • Smoking status: Never Smoker   • Smokeless tobacco: Former User   Substance Use Topics   • Alcohol use: Yes     Alcohol/week: 14.0 standard drinks     Types: 14 Shots of liquor per week     Comment: socially      Family History   Problem Relation Age of Onset   • Cancer Mother         LEUKEMIA   • Cancer Father         LYMPHOMA   • Cancer Brother         COLON CANCER   • Anesth Problems Neg Hx       Allergies   Allergen Reactions   • Prednisone Other (comments)     Effects pts vision permanentely   • Augmentin [Amoxicillin-Pot Clavulanate] Rash     Rash of lips   • Entex [Phenylephrine-Guaifenesin] Rash     Heart racing   • Levofloxacin Myalgia     Knees hurt   • Lotemax [Loteprednol Etabonate] Other (comments)     headaches   • Omnicef [Cefdinir] Rash     Mouth blisters    • Other Plant, Animal, Environmental Other (comments)     Styrofoam cups, blisters to mouth   • Penicillin G Rash     Rash on lips   • Scallops Nausea and Vomiting     And sensitive skin   • Shellfish Derived Nausea and Vomiting     Scallops only      Prior to Admission medications    Medication Sig Start Date End Date Taking? Authorizing Provider   aspirin delayed-release 325 mg tablet Take 1 Tab by mouth two (2) times a day. For blood clot prevention. 2/10/18   Fred Cason MD   acetaminophen (TYLENOL) 500 mg tablet Take 1 Tab by mouth every four (4) hours (while awake). Schedule for pain control over the next 7-14 days.  Do not exceed 3000 mg in 24 hours.  Obtain over-the-counter.  Indications: Postoperative Incisional Knee Pain 2/9/18   Izaiah Morejon NP   senna-docusate (PERICOLACE) 8.6-50 mg per tablet Take 1 Tab by mouth two (2) times a day. Take with full glass of fluid.  Do not take if having frequent or loose BMs.  Obtain over-the-counter.   Indications: Prevention of Postoperative Constipation 2/9/18   Florian Andino NP   fexofenadine (ALLEGRA) 180 mg tablet Take 180 mg by mouth daily. Provider, Historical   Biotin 2,500 mcg cap Take 1,000 mcg by mouth daily. Provider, Historical   OTHER USES 1 PROMISE EASY TEARS- HAS VITAMIN A, D, E AND FISH OIL IN IT. Provider, Historical   fluticasone (FLONASE) 50 mcg/actuation nasal spray nightly. Provider, Historical   MINERAL OIL, LIGHT/MINERAL OIL (SOOTHE XP OP) Apply  to eye. Provider, Historical   artificial tears,hypromellose, (GENTEAL MODERATE) 0.3 % drop Administer  to both eyes as needed. Provider, Historical   mirabegron ER (Myrbetriq) 25 mg ER tablet Take  by mouth daily. Provider, Historical   carboxymethylcellulose sodium (REFRESH LIQUIGEL) 1 % dlgl Apply 1 drop to eye four (4) times daily as needed. Provider, Historical   OTHER,NON-FORMULARY, Eye promise 2 tabs daily    Provider, Historical   multivitamin (ONE A DAY) tablet Take 1 tablet by mouth daily. Provider, Historical   cycloSPORINE (RESTASIS) 0.05 % ophthalmic emulsion Administer 1 drop to both eyes two (2) times a day.     Provider, Historical     REVIEW OF SYSTEMS:     []  Unable to obtain reliable ROS due to  [] mental status  [] sedated   [] intubated   [x] Total of 12 systems reviewed as follows:  Constitutional: negative fever, negative chills, negative weight loss  Eyes:   negative visual changes  ENT:   negative sore throat, tongue or lip swelling  Respiratory:  negative cough, negative dyspnea  Cards:  negative for chest pain, palpitations, lower extremity edema  GI:   negative for nausea, vomiting, diarrhea, and abdominal pain  :  negative for frequency, dysuria  Integument:  negative for rash and pruritus  Heme:  negative for easy bruising and gum/nose bleeding  Musculoskel: negative for myalgias,  back pain and muscle weakness  Neuro:  negative for headaches, dizziness, vertigo  Psych:  negative for feelings of anxiety, depression   Travel?: none    Objective:   VITALS:    Visit Vitals  /70 (BP 1 Location: Right upper arm, BP Patient Position: Supine)   Pulse (!) 54   Temp 98.6 °F (37 °C)   Resp 17   Ht 5' 7\" (1.702 m)   Wt 77.1 kg (170 lb)   SpO2 97%   Breastfeeding No   BMI 26.63 kg/m²     PHYSICAL EXAM:  Gen:  [x]  WD [x]  WN  [] cachectic []  thin []  obese []  disheveled             []  ill apearing  []   Critical  []   Chronic    [x]  No acute distress    HEENT:   [x] NC/AT/PERRL    [x] pink conjunctivae      [] pale conjunctivae                  PERRL  [] yes  [] no      [] moist mucosa    [] dry mucosa    hearing intact to voice [] yes  [] No                 NECK:   supple [x] yes  [] no        masses [] yes  [] No               thyroid  []  non tender  []  tender    RESP:   [x] CTA bilaterally/no wheezing/rhonchi/rales/crackles    [] rhonchi bilaterally - no dullness  [] wheezing   [] rhonchi   [] crackles     use of accessory muscles [] yes [] no    CARD:   [x]  regular rate and rhythm/No murmurs/rubs/gallops    murmur  [] yes ()  [] no      Rubs  [] yes  [] no       Gallops [] yes  [] no    Rate []  regular  []  irregular        carotid bruits  [] Right  []  Left                 LE edema [] yes  [x] no           JVP  []  yes   []  no    ABD:    [x] soft/non distended/non tender/+bowel sounds/no HSM    []  Rigid    tenderness [] yes [] no   Liver enlargement  []  yes []  no                Spleen enlargement  []  yes []  no     distended []  yes [] no     bowel sound  [] hypoactive   [] hyperactive    LYMPH:    Neck []  yes []  no       Axillae []  yes []  no    SKIN:   Rashes []  yes   []  no    Ulcers []  yes   []  no               [] tight to palpitation    skin turgor []  good  [] poor  [] decreased               Cyanosis/clubbing []  yes []  no    NEUR:   [] cranial nerves II-XII grossly intact       [] Cranial nerves deficit                 []  facial droop    []  slurred speech   [] aphasic     [] Strength normal     []  weakness  []  LUE  []   RUE/ []  LLE  []   RLE    follows commands  []  yes []  no           PSYCH:   insight [] poor [] good   Alert and Oriented to  [] person  [] place  []  time                    [] depressed [] anxious [] agitated  [] lethargic [] stuporous  [] sedated     LAB DATA REVIEWED:    Recent Labs     02/09/21  0923   HGB 13.3   HCT 40.1     No results for input(s): NA, K, CL, CO2, BUN, CREA, GLU, CA, MG, PHOS, URICA in the last 72 hours. No results for input(s): ALT, AP, TBIL, TBILI, ALB, GLOB, GGT, AML, LPSE in the last 72 hours. No lab exists for component: SGOT, GPT, AMYP, HLPSE  No results for input(s): INR, PTP, APTT, INREXT in the last 72 hours. No results for input(s): FE, TIBC, PSAT, FERR in the last 72 hours. No results for input(s): PH, PCO2, PO2 in the last 72 hours. No results for input(s): CPK, CKMB in the last 72 hours.     No lab exists for component: TROPONINI  Lab Results   Component Value Date/Time    Glucose (POC) 89 02/08/2018 07:00 AM    Glucose (POC) 82 01/27/2015 06:58 AM       Procedures: see electronic medical records for all procedures/Xrays and details which were not copied into this note but were reviewed prior to creation of Plan.    ________________________________________________________________________       ___________________________________________________  Consulting Physician: Eren Emery MD

## 2021-02-10 VITALS
BODY MASS INDEX: 26.68 KG/M2 | HEIGHT: 67 IN | DIASTOLIC BLOOD PRESSURE: 62 MMHG | WEIGHT: 170 LBS | OXYGEN SATURATION: 97 % | RESPIRATION RATE: 18 BRPM | SYSTOLIC BLOOD PRESSURE: 101 MMHG | HEART RATE: 51 BPM | TEMPERATURE: 97.4 F

## 2021-02-10 LAB — HCT VFR BLD AUTO: 40.2 % (ref 35–47)

## 2021-02-10 PROCEDURE — 36415 COLL VENOUS BLD VENIPUNCTURE: CPT

## 2021-02-10 PROCEDURE — 85014 HEMATOCRIT: CPT

## 2021-02-10 RX ORDER — ACETAMINOPHEN 500 MG
500 TABLET ORAL
Status: DISCONTINUED | OUTPATIENT
Start: 2021-02-10 | End: 2021-02-10 | Stop reason: HOSPADM

## 2021-02-10 NOTE — DISCHARGE SUMMARY
Discharge Summary    NAME: Patrick Rainey   :  1950   MRN:  [id number]     DISCHARGE DIAGNOSIS:  CKd-3b    CONSULTATIONS:  None    Follow Up: Follow-up Information     Follow up With Specialties Details Why Contact Info    Candance Schiller, MD Family Medicine   [address]  [phone]            Procedures: see electronic medical records for all procedures/Xrays and details which were not copied into this note but were reviewed prior to creation of Plan. Please follow-up tests/labs that are still pendin. Renal biopsy    PMH/SH reviewed - no change compared to H&P    DISCHARGE SUMMARY/HOSPITAL COURSE: for full details see H&P, daily progress notes, labs, consult notes. Briefly As Per HPI:   Patient in obs for biopsy. Tolerated well. The patient's hospital course was complicated by:  nothing      _______________________________________________________________________   Patient seen and examined by me on day of discharge. Pertinent findings are:  Gen: NAd  HEENT:NCAT  Chest:CTA  Cv:RRR  Abd:soft; no flank pain/tenderness  Neuro:nonfocal    See Discharge Instructions for further details. _______________________________________________________________________    Medications Reviewed:  Current Discharge Medication List      CONTINUE these medications which have NOT CHANGED    Details   aspirin delayed-release 325 mg tablet Take 1 Tab by mouth two (2) times a day. For blood clot prevention. Qty: 60 Tab, Refills: 0      fexofenadine (ALLEGRA) 180 mg tablet Take 180 mg by mouth daily. Biotin 2,500 mcg cap Take 1,000 mcg by mouth daily. OTHER USES 1 PROMISE EASY TEARS- HAS VITAMIN A, D, E AND FISH OIL IN IT.      fluticasone (FLONASE) 50 mcg/actuation nasal spray nightly. MINERAL OIL, LIGHT/MINERAL OIL (SOOTHE XP OP) Apply  to eye. artificial tears,hypromellose, (GENTEAL MODERATE) 0.3 % drop Administer  to both eyes as needed.       mirabegron ER (Myrbetriq) 25 mg ER tablet Take  by mouth daily. carboxymethylcellulose sodium (REFRESH LIQUIGEL) 1 % dlgl Apply 1 drop to eye four (4) times daily as needed. multivitamin (ONE A DAY) tablet Take 1 tablet by mouth daily. cycloSPORINE (RESTASIS) 0.05 % ophthalmic emulsion Administer 1 drop to both eyes two (2) times a day.           _______________________________________________________________________    Risk of deterioration: Low  ________________________________________________________________________    Disposition  Home with family, no needs  ________________________________________________________________________    Care Plan discussed with:   Patient  ________________________________________________________________________    Code Status: Full Code  ________________________________________________________________________        ________________________________________________________________________    CDMP Checked: Yes    Signed: Sonu Black MD    This note will not be viewable in [address]

## 2021-02-10 NOTE — PROGRESS NOTES
CM acknowledged d/c.  Pt's  present at bedside to provide transport. Pt clear from CM standpoint, RN notified. Full note to follow.     IVELISSE Adler Intern  Care Management

## 2021-02-10 NOTE — PROGRESS NOTES
01/19/20    Internal medicine progress note.    Subjective   Awake and comfortable   Remains on vent.  Intermittent agitation.    Interacts a bit better      Vitals   126/78  85  16   97.1  Skin dry  On vent   Feet toes appear  blackish and a evidence of dry gangrene   Neck supple trach is present .  Lung scattered crackles but adequate air flow   Tenderness is present.  CVS S 1 and S 2  Systolic murmur  Abdomen bowel sound present peg is present   CNS she is a bit more awake and interactive     Recent Labs   Lab 01/13/20  0445   WBC 13.0*   HGB 8.2*   HCT 28.6*        Recent Labs   Lab 01/17/20  0404 01/15/20  0535 01/13/20  0445   SODIUM 133* 136 136   POTASSIUM 4.7 3.7 4.3   CHLORIDE 100 101 99   CO2 24 30 27   BUN 42* 45* 59*   CREATININE 2.84* 2.81* 3.39*   GLUCOSE 267* 224* 201*   CALCIUM 9.9 9.7 9.7     Impression:   J96.01  Acute respiratory failure with hypoxia  J96.10 Chronic respiratory failure, unspecified whether with hypoxia or hypercapnia\  .N18.6 End stage renal disease  .I10 Essential Hypertension  G93.40 Encephalopathy, unspecified  D64.9 Anemia, unspecified  AV fistula infection  Cardiac arrest S/p CPR.  Legal blindness   E10.10    Type 1 diabetes mellitus with ketoacidosis without coma        Plan:  Supportive care .  Follows labs.  Continue  lantus and sliding scale insulin.  Vent support   Tube feeding .  Renew bilateral mitt restraints .  prn clonazepam.  PICC placed   Change hydrocortisone to po .  And likely has chronic insufficiency   lane tylenol for pain   lidocaine patch.  Status post cardiopulmonary arrest and resuscitation.  Evidence of bilateral feet ischemia with dry gangrene continue to monitor.  Midodrine 15 mg p.o. t.i.d.  Continue supportive hemodialysis.  Continue wound care.  Subcutaneous heparin for DVT prophylaxis.  PT OT as tolerated.       Jeannie Pisano MD     NAME: Noah Campbell        :  1950        MRN:  [id number]                     Assessment   :                                               Plan:  CKD-3b - Dr. Christopher Mena - creatinine 1.5  Proteinuria - 9593-4098 mg  sjogren's S/P renal biopsy. SBP low but stable. she says that she always runs low. On no meds.     Tylenol for pain. Regular diet.       H/H stable. OK for DC.            Subjective:     Chief Complaint:  \" I'm ready to go! \"  No N/V. No dyspnea. No pain. Review of Systems:    Symptom Y/N Comments  Symptom Y/N Comments   Fever/Chills    Chest Pain     Poor Appetite    Edema     Cough    Abdominal Pain     Sputum    Joint Pain     SOB/OGDEN    Pruritis/Rash     Nausea/vomit    Tolerating PT/OT     Diarrhea    Tolerating Diet     Constipation    Other       Could not obtain due to:      Objective:     VITALS:   Last 24hrs VS reviewed since prior progress note.  Most recent are:  Visit Vitals  /62 (BP 1 Location: Right upper arm, BP Patient Position: Sitting)   Pulse (!) 51   Temp 97.4 °F (36.3 °C)   Resp 18   Ht 5' 7\" (1.702 m)   Wt 77.1 kg (170 lb)   SpO2 97%   Breastfeeding No   BMI 26.63 kg/m²       Intake/Output Summary (Last 24 hours) at 2/10/2021 1864  Last data filed at 2021 2346  Gross per 24 hour   Intake --   Output 2600 ml   Net -2600 ml      Telemetry Reviewed:     PHYSICAL EXAM:  General: NAD  No edema      Lab Data Reviewed: (see below)    Medications Reviewed: (see below)    PMH/SH reviewed - no change compared to H&P  ________________________________________________________________________  Care Plan discussed with:  Patient     Family      RN     Care Manager                    Consultant:          Comments   >50% of visit spent in counseling and coordination of care       ________________________________________________________________________  MD Rigo Lopez: margie electronic medical records for all procedures/Xrays and details which  were not copied into this note but were reviewed prior to creation of Plan. LABS:  Recent Labs     02/10/21  0319 02/09/21  1840 02/09/21  0923   HGB  --   --  13.3   HCT 40.2 40.0 40.1     No results for input(s): NA, K, CL, CO2, BUN, CREA, GLU, CA, MG, PHOS, URICA in the last 72 hours. No results for input(s): AP, TBIL, TP, ALB, GLOB, GGT, AML, LPSE in the last 72 hours. No lab exists for component: SGOT, GPT, AMYP, HLPSE  No results for input(s): INR, PTP, APTT, INREXT in the last 72 hours. No results for input(s): FE, TIBC, PSAT, FERR in the last 72 hours. No results found for: FOL, RBCF   No results for input(s): PH, PCO2, PO2 in the last 72 hours. No results for input(s): CPK, CKMB in the last 72 hours.     No lab exists for component: TROPONINI  No components found for: [place]  Lab Results   Component Value Date/Time    Color YELLOW/STRAW 01/17/2018 08:47 AM    Appearance CLEAR 01/17/2018 08:47 AM    Specific gravity 1.011 01/17/2018 08:47 AM    pH (UA) 6.5 01/17/2018 08:47 AM    Protein NEGATIVE  01/17/2018 08:47 AM    Glucose 100 (A) 01/17/2018 08:47 AM    Ketone NEGATIVE  01/17/2018 08:47 AM    Bilirubin NEGATIVE  01/17/2018 08:47 AM    Urobilinogen 0.2 01/17/2018 08:47 AM    Nitrites NEGATIVE  01/17/2018 08:47 AM    Leukocyte Esterase NEGATIVE  01/17/2018 08:47 AM    Epithelial cells FEW 01/17/2018 08:47 AM    Bacteria NEGATIVE  01/17/2018 08:47 AM    WBC 0-4 01/17/2018 08:47 AM    RBC 0-5 01/17/2018 08:47 AM       MEDICATIONS:  Current Facility-Administered Medications   Medication Dose Route Frequency    cloNIDine HCL (CATAPRES) tablet 0.1 mg  0.1 mg Oral PRN

## 2021-02-10 NOTE — PROGRESS NOTES
ROMEL Plan:  Home w/ f/u appt  PCP f/u appt secured; details in AVS   at bedside to transport at d/c  Observation status acknowledged. >MOON/ state letters provided    Reason for Admission:   Proteinuria                   RUR Score:          OBSERVATION (n/a)           Plan for utilizing home health:      Prior hx; no needs identified at this time. PCP: First and Last name:  Taran Contreras. Morro So MD   Name of Practice:    Are you a current patient: Yes/No: Yes   Approximate date of last visit:    Can you participate in a virtual visit with your PCP: Yes                    Current Advanced Directive/Advance Care Plan:   Angy 13 (ACP) Conversation  Date of Conversation: 2/10/2021  Conducted with: Patient with Emily [facility]:   Nerissa Henderson, [phone]/ [phone]    Content/Action Overview: Has ACP document(s) on file - reflects the patient's care preferences  Reviewed DNR/DNI and patient elects Full Code (Attempt Resuscitation)    Length of Voluntary ACP Conversation in minutes:  <16 minutes (Non-Billable)    Transition of Care Plan:   Home w/ PCP f/u appt            SW Intern: KRYSTYNA met with pt and her , Maureen Sanders ([phone]), at bedside to introduce role, complete assessment, and review ROMEL plan for d/c. KRYSTYNA confirmed pt's demographic information is up to date. Pt reported she lives in single story home/ [address] with her . Pt is independent w/ ADLs/ IADLs. Oral and written notification given to patient informing them that they are currently outpatient receiving care in facility. Patients who are in outpatient status also receive the Observation notice. Pt received state observation & MOON letters; signed copies placed on bedside chart. Pt and her  verbalized understanding and agreeable to d/c; reported no additional needs or concerns at this time.     Care Management Interventions  PCP Verified by CM: Yes(Dr. Mckenna Coleman in [place], [place])  Palliative Care Criteria Met (RRAT>21 & CHF Dx)?: No  Mode of Transport at Discharge:  Other (see comment)(Pt's  at bedside)  Hospital Transport Time of Discharge: 0930  Transition of Care Consult (CM Consult): Discharge Planning  Discharge Durable Medical Equipment: No(pt has DME needed)  Physical Therapy Consult: No  Occupational Therapy Consult: No  Speech Therapy Consult: No  Current Support Network: Lives with Spouse, Own Home(single story home/ [address])  Confirm Follow Up Transport: Family  Discharge Location  Discharge Placement: Home(w/ PCP f/u appt)    Satish Morris MSW Intern  Care Management

## 2021-02-10 NOTE — PROGRESS NOTES
Problem: Falls - Risk of  Goal: *Absence of Falls  Description: Document Avni Joss Fall Risk and appropriate interventions in the flowsheet.   Outcome: Resolved/Met  Note: Fall Risk Interventions:            Medication Interventions: Patient to call before getting OOB, Teach patient to arise slowly    Elimination Interventions: Call light in reach              Problem: Patient Education: Go to Patient Education Activity  Goal: Patient/Family Education  Outcome: Resolved/Met

## 2021-02-10 NOTE — PROGRESS NOTES
Bedside shift change report given to Chela Dutta RN (oncoming nurse) by Marce Millan (offgoing nurse). Report included the following information SBAR, Kardex, ED Summary, Procedure Summary, Intake/Output, MAR and Recent Results.

## 2021-02-10 NOTE — PROGRESS NOTES
End of Shift Note    Bedside shift change report given to [facility] (oncoming nurse) by Susu Brown (offgoing nurse). Report included the following information SBAR, Kardex, Procedure Summary, Intake/Output, MAR, Accordion and Recent Results    Shift worked:  7a-7p     Shift summary and any significant changes:     received after CT guided kidney biopsy, bedrest until 4 pm and then pt ambulated with stand by assist to bathroom, Racking urine, pt ambulating in room and hallway ad tae     Concerns for physician to address:       Zone phone for oncoming shift:          Activity:  Activity Level: Up ad tae  Number times ambulated in hallways past shift: 2  Number of times OOB to chair past shift: 0    Cardiac:   Cardiac Monitoring: No      Cardiac Rhythm: Sinus bradycardia    Access:   Current line(s): PIV     Genitourinary:   Urinary status: voiding    Respiratory:   O2 Device: Room air  Chronic home O2 use?: NO  Incentive spirometer at bedside: NO     GI:     Current diet:  DIET REGULAR  Passing flatus: YES  Tolerating current diet: YES       Pain Management:   Patient states pain is manageable on current regimen: N/A    Skin:  Nik Score: 23  Interventions: increase time out of bed    Patient Safety:  Fall Score:  Total Score: 1  Interventions: gripper socks       Length of Stay:  Expected LOS: - - -  Actual LOS: 0      Susu Brown

## 2022-01-24 ENCOUNTER — OFFICE VISIT (OUTPATIENT)
Dept: ORTHOPEDIC SURGERY | Age: 72
End: 2022-01-24

## 2022-01-24 VITALS — WEIGHT: 170 LBS | HEIGHT: 67 IN | BODY MASS INDEX: 26.68 KG/M2

## 2022-01-24 DIAGNOSIS — M51.36 DDD (DEGENERATIVE DISC DISEASE), LUMBAR: ICD-10-CM

## 2022-01-24 DIAGNOSIS — M16.12 PRIMARY OSTEOARTHRITIS OF LEFT HIP: Primary | ICD-10-CM

## 2022-01-24 DIAGNOSIS — M25.552 LEFT HIP PAIN: ICD-10-CM

## 2022-01-24 PROCEDURE — 1101F PT FALLS ASSESS-DOCD LE1/YR: CPT | Performed by: ORTHOPAEDIC SURGERY

## 2022-01-24 PROCEDURE — 1090F PRES/ABSN URINE INCON ASSESS: CPT | Performed by: ORTHOPAEDIC SURGERY

## 2022-01-24 PROCEDURE — G8419 CALC BMI OUT NRM PARAM NOF/U: HCPCS | Performed by: ORTHOPAEDIC SURGERY

## 2022-01-24 PROCEDURE — G8427 DOCREV CUR MEDS BY ELIG CLIN: HCPCS | Performed by: ORTHOPAEDIC SURGERY

## 2022-01-24 PROCEDURE — G8536 NO DOC ELDER MAL SCRN: HCPCS | Performed by: ORTHOPAEDIC SURGERY

## 2022-01-24 PROCEDURE — 99213 OFFICE O/P EST LOW 20 MIN: CPT | Performed by: ORTHOPAEDIC SURGERY

## 2022-01-24 PROCEDURE — 3017F COLORECTAL CA SCREEN DOC REV: CPT | Performed by: ORTHOPAEDIC SURGERY

## 2022-01-24 PROCEDURE — G8432 DEP SCR NOT DOC, RNG: HCPCS | Performed by: ORTHOPAEDIC SURGERY

## 2022-01-24 PROCEDURE — G8400 PT W/DXA NO RESULTS DOC: HCPCS | Performed by: ORTHOPAEDIC SURGERY

## 2022-01-24 NOTE — LETTER
1/24/2022    Patient: Shanice Jo   YOB: 1950   Date of Visit: 1/24/2022     Angelica Grant MD  [address] [facility] [address]  Via Fax: [phone]    Dear Angelica Grant MD,      Thank you for referring Ms. Rickey Brady to [facility] for evaluation. My notes for this consultation are attached. If you have questions, please do not hesitate to call me. I look forward to following your patient along with you.       Sincerely,    Eulalia Morris MD

## 2022-01-24 NOTE — PROGRESS NOTES
Susie Morgan (: 1950) is a 66 y.o. female, patient, here for evaluation of the following chief complaint(s):  Hip Pain (left hip and groin)       SUBJECTIVE/OBJECTIVE:  Susie Morgan presents today complaining of left hip pain. She started with acute groin pain around Thanksgiving. Has waxed and waned ever since. She has periods of time where it does not bother her much, then may limit her significantly. When flared up she has trouble with simple ADLs. Intermittent wakening night pain in the groin. Relates buttock pain intermittently with activities, not as severe as the groin pain. She takes chronic tramadol twice daily, Tylenol. She has been on prednisone for a Sjogren's flare involving her kidneys. PHYSICAL EXAM:  Vitals: Ht 5' 6.5\" (1.689 m)   Wt 170 lb (77.1 kg)   BMI 27.03 kg/m²   Body mass index is 27.03 kg/m². 66y.o. year old F, no distress. Ambulates without limp or Trendelenburg gait. Extension of lumbar spine reproduces gluteal pain. No nerve tension signs. No paraspinal spasm or tenderness. No trochanteric tenderness over left hip. Hip exam is benign today with negative Stinchfield, well-preserved left hip range of motion without significant reproducible groin discomfort. Symmetrical palpable distal pulses. No gross motor or sensory deficits in lower extremities. No distal edema. IMAGING:  Radiographs: XR Results (most recent):  Results from Appointment encounter on 22    XR HIP LT W OR WO PELV 2-3 VWS    Narrative  3 x-ray views left hip including AP pelvis, AP and frog-lateral images demonstrate mild loss of left hip joint space. Labral calcification. Evidence of severe lumbar degenerative disc disease at L4-5 and L5-S1.        ASSESSMENT/PLAN:  1.  Primary osteoarthritis of left hip  -     REFERRAL TO PHYSICAL THERAPY  2. DDD (degenerative disc disease), lumbar  -     REFERRAL TO PHYSICAL THERAPY  3. Left hip pain  -     XR HIP LT W OR WO PELV 2-3 VWS; Future    The xray and exam findings were discussed with the patient today. Combined symptoms from lumbar degenerative disc disease, left hip osteoarthritis. She is already on fairly significant dose of prednisone. I will send her for physical therapy for both issues. If groin symptoms do not improve, may consider intra-articular hip injection with musculoskeletal radiology. She will return as needed for the symptoms, and for routine long-term follow-up of both total knees. Return if symptoms worsen or fail to improve. Review Of Systems  ROS     Positive for: Musculoskeletal    Last edited by Renetta Vizcaino RN on 1/24/2022  3:39 PM. (History)         Patient denies any recent fever, chills, nausea, vomiting, chest pain, or shortness of breath. Allergies   Allergen Reactions    Prednisone Other (comments)     Effects pts vision permanentely    Augmentin [Amoxicillin-Pot Clavulanate] Rash     Rash of lips    Entex [Phenylephrine-Guaifenesin] Rash     Heart racing    Levofloxacin Myalgia     Knees hurt    Lotemax [Loteprednol Etabonate] Other (comments)     headaches    Omnicef [Cefdinir] Rash     Mouth blisters     Other Plant, Animal, Environmental Other (comments)     Styrofoam cups, blisters to mouth    Penicillin G Rash     Rash on lips    Scallops Nausea and Vomiting     And sensitive skin    Shellfish Derived Nausea and Vomiting     Scallops only       Current Outpatient Medications   Medication Sig    aspirin delayed-release 325 mg tablet Take 1 Tab by mouth two (2) times a day. For blood clot prevention.  acetaminophen (TYLENOL) 500 mg tablet Take 1 Tab by mouth every four (4) hours (while awake). Schedule for pain control over the next 7-14 days. Do not exceed 3000 mg in 24 hours. Obtain over-the-counter. Indications: Postoperative Incisional Knee Pain    senna-docusate (PERICOLACE) 8.6-50 mg per tablet Take 1 Tab by mouth two (2) times a day.  Take with full glass of fluid. Do not take if having frequent or loose BMs. Obtain over-the-counter. Indications: Prevention of Postoperative Constipation    fexofenadine (ALLEGRA) 180 mg tablet Take 180 mg by mouth daily.  Biotin 2,500 mcg cap Take 1,000 mcg by mouth daily.  OTHER USES 1 PROMISE EASY TEARS- HAS VITAMIN A, D, E AND FISH OIL IN IT.    fluticasone (FLONASE) 50 mcg/actuation nasal spray nightly.  MINERAL OIL, LIGHT/MINERAL OIL (SOOTHE XP OP) Apply  to eye.  artificial tears,hypromellose, (GENTEAL MODERATE) 0.3 % drop Administer  to both eyes as needed.  mirabegron ER (Myrbetriq) 25 mg ER tablet Take  by mouth daily.  carboxymethylcellulose sodium (REFRESH LIQUIGEL) 1 % dlgl Apply 1 drop to eye four (4) times daily as needed.  OTHER,NON-FORMULARY, Eye promise 2 tabs daily    multivitamin (ONE A DAY) tablet Take 1 tablet by mouth daily.  cycloSPORINE (RESTASIS) 0.05 % ophthalmic emulsion Administer 1 drop to both eyes two (2) times a day. No current facility-administered medications for this visit.        Past Medical History:   Diagnosis Date    Arthritis     osteo    Cancer ([facility] [address]     squamous cell on nose lt side    Chronic kidney disease     seeing dr alvarez for overactive bladder/ kidneys dr Jono Hitchcock    Chronic tubulointerstitial nephritis     Erythema nodosum     RIGHT LEG AND FOOT    History of shingles     Ill-defined condition     possible tubulointerstitual disease    Ill-defined condition     raynaud's phenomenon    Ill-defined condition     erythema nodosum rt leg and foot    Ill-defined condition     plantar fiascitis    Ill-defined disease     lickens planus lt side of tongue    Psychiatric disorder     depression    Raynaud's phenomenon (by history or observed)     Sjogren's disease ([address]     Tinnitus         Past Surgical History:   Procedure Laterality Date    HX APPENDECTOMY      HX CATARACT REMOVAL Bilateral 2016    HX CHOLECYSTECTOMY  2015    HX COLONOSCOPY 2008    HX GYN      3 vaginal delivery    HX HYSTERECTOMY      HX KNEE REPLACEMENT  01/2017    LEFT TOTAL KNEE REPLACEMENT     HX ORTHOPAEDIC  2003    right knee 2003- TORN CARTILAGE REPAIR, ARTHROSCOPIC    HX OTHER SURGICAL      STRESS TEST IN 2010 - NORMAL PER PATIENT    HX UROLOGICAL      RIGHT ADRENAL MASS     [place] ABDOMEN SURGERY PROC UNLISTED  1/27/15    DAVINCI RIGHT ADRENALECTOMY and CHOLECYSTECOMTY       Family History   Problem Relation Age of Onset    Cancer Mother         LEUKEMIA    Cancer Father         LYMPHOMA    Cancer Brother         COLON CANCER    Anesth Problems Neg Hx         Social History     Socioeconomic History    Marital status:      Spouse name: Not on file    Number of children: Not on file    Years of education: Not on file    Highest education level: Not on file   Occupational History    Not on file   Tobacco Use    Smoking status: Never Smoker    Smokeless tobacco: Former User   Substance and Sexual Activity    Alcohol use: Yes     Alcohol/week: 14.0 standard drinks     Types: 14 Shots of liquor per week     Comment: socially    Drug use: No    Sexual activity: Not on file   Other Topics Concern    Not on file   Social History Narrative    Not on file     Social Determinants of Health     Financial Resource Strain:     Difficulty of Paying Living Expenses: Not on file   Food Insecurity:     Worried About Running Out of Food in the Last Year: Not on file    Charmaine of Food in the Last Year: Not on file   Transportation Needs:     Lack of Transportation (Medical): Not on file    Lack of Transportation (Non-Medical):  Not on file   Physical Activity:     Days of Exercise per Week: Not on file    Minutes of Exercise per Session: Not on file   Stress:     Feeling of Stress : Not on file   Social Connections:     Frequency of Communication with Friends and Family: Not on file    Frequency of Social Gatherings with Friends and Family: Not on file   eRnu Attends [employer] Services: Not on file    Active Member of Clubs or Organizations: Not on file    Attends Club or Organization Meetings: Not on file    Marital Status: Not on file   Intimate Partner Violence:     Fear of Current or Ex-Partner: Not on file    Emotionally Abused: Not on file    Physically Abused: Not on file    Sexually Abused: Not on file   Housing Stability:     Unable to Pay for Housing in the Last Year: Not on file    Number of [place] in the Last Year: Not on file    Unstable Housing in the Last Year: Not on file       Orders Placed This Encounter    XR HIP LT W OR WO PELV 2-3 VWS     Room 8     Standing Status:   Future     Number of Occurrences:   1     Standing Expiration Date:   1/25/2023    REFERRAL TO PHYSICAL THERAPY     Referral Priority:   Routine     Referral Type:   PT/OT/ST     Referral Reason:   Specialty Services Required     Requested Specialty:   Physical Therapy     Number of Visits Requested:   1        An electronic signature was used to authenticate this note.   -- Iván Fung MD

## 2022-03-18 PROBLEM — R80.9 PROTEINURIA: Status: ACTIVE | Noted: 2021-02-09

## 2022-03-19 PROBLEM — M17.11 PRIMARY OSTEOARTHRITIS OF ONE KNEE, RIGHT: Status: ACTIVE | Noted: 2018-02-08

## 2022-03-19 PROBLEM — M17.11 OSTEOARTHRITIS OF RIGHT KNEE: Status: ACTIVE | Noted: 2018-02-09

## 2022-04-04 ENCOUNTER — OFFICE VISIT (OUTPATIENT)
Dept: ORTHOPEDIC SURGERY | Age: 72
End: 2022-04-04
Payer: MEDICARE

## 2022-04-04 VITALS — WEIGHT: 185 LBS | HEIGHT: 66 IN | BODY MASS INDEX: 29.73 KG/M2

## 2022-04-04 DIAGNOSIS — M16.12 PRIMARY OSTEOARTHRITIS OF LEFT HIP: Primary | ICD-10-CM

## 2022-04-04 PROCEDURE — G8419 CALC BMI OUT NRM PARAM NOF/U: HCPCS | Performed by: ORTHOPAEDIC SURGERY

## 2022-04-04 PROCEDURE — G8427 DOCREV CUR MEDS BY ELIG CLIN: HCPCS | Performed by: ORTHOPAEDIC SURGERY

## 2022-04-04 PROCEDURE — G8432 DEP SCR NOT DOC, RNG: HCPCS | Performed by: ORTHOPAEDIC SURGERY

## 2022-04-04 PROCEDURE — 3017F COLORECTAL CA SCREEN DOC REV: CPT | Performed by: ORTHOPAEDIC SURGERY

## 2022-04-04 PROCEDURE — 99213 OFFICE O/P EST LOW 20 MIN: CPT | Performed by: ORTHOPAEDIC SURGERY

## 2022-04-04 PROCEDURE — G8536 NO DOC ELDER MAL SCRN: HCPCS | Performed by: ORTHOPAEDIC SURGERY

## 2022-04-04 PROCEDURE — 1101F PT FALLS ASSESS-DOCD LE1/YR: CPT | Performed by: ORTHOPAEDIC SURGERY

## 2022-04-04 PROCEDURE — G8400 PT W/DXA NO RESULTS DOC: HCPCS | Performed by: ORTHOPAEDIC SURGERY

## 2022-04-04 PROCEDURE — 1090F PRES/ABSN URINE INCON ASSESS: CPT | Performed by: ORTHOPAEDIC SURGERY

## 2022-04-04 RX ORDER — TRAMADOL HYDROCHLORIDE 50 MG/1
50 TABLET ORAL
COMMUNITY
Start: 2022-03-31

## 2022-04-04 RX ORDER — SODIUM BICARBONATE 650 MG/1
650 TABLET ORAL 3 TIMES DAILY
COMMUNITY
Start: 2021-08-02

## 2022-04-04 RX ORDER — VENLAFAXINE 37.5 MG/1
37.5 TABLET ORAL 2 TIMES DAILY
COMMUNITY

## 2022-04-04 RX ORDER — POTASSIUM CHLORIDE 20 MEQ/1
20 TABLET, EXTENDED RELEASE ORAL
COMMUNITY

## 2022-04-04 RX ORDER — MYCOPHENOLATE MOFETIL 500 MG/1
TABLET ORAL
COMMUNITY
Start: 2022-04-01 | End: 2022-04-22

## 2022-04-04 NOTE — PROGRESS NOTES
Fam Pichardo (: 1950) is a 74 y.o. female, patient, here for evaluation of the following chief complaint(s):  Hip Pain (left groin)       SUBJECTIVE/OBJECTIVE:  Fam Pichardo presents today complaining of left hip pain. To review, she started with acute groin pain around Thanksgiving. Has waxed and waned ever since. She has periods of time where it does not bother her much, then may limit her significantly. When flared up she has trouble with simple ADLs. Intermittent wakening night pain in the groin. Relates buttock pain intermittently with activities, not as severe as the groin pain. She takes chronic tramadol twice daily, Tylenol. She has been on prednisone for a Sjogren's flare involving her kidneys. After her last visit we started physical therapy which helped significantly. She was having only mild very low-grade symptoms, and then went for a 3-hour car ride which exacerbated her left groin symptoms. PHYSICAL EXAM:  Vitals: Ht 5' 6\" (1.676 m)   Wt 185 lb (83.9 kg)   BMI 29.86 kg/m²   Body mass index is 29.86 kg/m². 74y.o. year old F, no distress. Ambulates without limp or Trendelenburg gait. Positive Stinchfield left hip. Mild groin discomfort at extremes of rotation which is well-preserved. No trochanteric tenderness. Symmetrical palpable distal pulses. No gross motor or sensory deficits in lower extremities. No distal edema. IMAGING:  Radiographs: X-rays of left hip from January demonstrate mild loss of proximal hip joint space. ASSESSMENT/PLAN:  1. Primary osteoarthritis of left hip  -     REFERRAL TO PHYSICAL THERAPY  -     XR INJ ASP LARGE JOINT / BURSA; Future    The xray and exam findings were discussed with the patient today. Another exacerbation of mild radiographic left hip osteoarthritis. She continues on prednisone for her rheumatologic issues. We will move forward with intra-articular injection of the left hip to be done by radiology.   A week or so after the injection, she will resume physical therapy. Return as needed. No follow-ups on file. Review Of Systems  ROS     Positive for: Musculoskeletal    Last edited by Dylon Powers RN on 4/4/2022  9:54 AM. (History)         Patient denies any recent fever, chills, nausea, vomiting, chest pain, or shortness of breath. Allergies   Allergen Reactions    Prednisone Other (comments)     Effects pts vision permanentely    Augmentin [Amoxicillin-Pot Clavulanate] Rash     Rash of lips    Entex [Phenylephrine-Guaifenesin] Rash     Heart racing    Levofloxacin Myalgia     Knees hurt    Lotemax [Loteprednol Etabonate] Other (comments)     headaches    Omnicef [Cefdinir] Rash     Mouth blisters     Other Plant, Animal, Environmental Other (comments)     Styrofoam cups, blisters to mouth    Penicillin G Rash     Rash on lips    Scallops Nausea and Vomiting     And sensitive skin    Shellfish Derived Nausea and Vomiting     Scallops only       Current Outpatient Medications   Medication Sig    mycophenolate (CELLCEPT) 500 mg tablet     traMADoL (ULTRAM) 50 mg tablet Take 50 mg by mouth two (2) times daily as needed.  venlafaxine (EFFEXOR) 37.5 mg tablet Take 37.5 mg by mouth two (2) times a day.  sodium bicarbonate 650 mg tablet Take 650 mg by mouth two (2) times a day.  potassium chloride (K-DUR, KLOR-CON M20) 20 mEq tablet Take 20 mEq by mouth.  fluticasone (FLONASE) 50 mcg/actuation nasal spray nightly.  cycloSPORINE (RESTASIS) 0.05 % ophthalmic emulsion Administer 1 drop to both eyes two (2) times a day.  aspirin delayed-release 325 mg tablet Take 1 Tab by mouth two (2) times a day. For blood clot prevention.  acetaminophen (TYLENOL) 500 mg tablet Take 1 Tab by mouth every four (4) hours (while awake). Schedule for pain control over the next 7-14 days. Do not exceed 3000 mg in 24 hours. Obtain over-the-counter.   Indications: Postoperative Incisional Knee Pain    senna-docusate (PERICOLACE) 8.6-50 mg per tablet Take 1 Tab by mouth two (2) times a day. Take with full glass of fluid. Do not take if having frequent or loose BMs. Obtain over-the-counter. Indications: Prevention of Postoperative Constipation    fexofenadine (ALLEGRA) 180 mg tablet Take 180 mg by mouth daily.  Biotin 2,500 mcg cap Take 1,000 mcg by mouth daily.  OTHER USES 1 PROMISE EASY TEARS- HAS VITAMIN A, D, E AND FISH OIL IN IT.    MINERAL OIL, LIGHT/MINERAL OIL (SOOTHE XP OP) Apply  to eye.  artificial tears,hypromellose, (GENTEAL MODERATE) 0.3 % drop Administer  to both eyes as needed.  mirabegron ER (Myrbetriq) 25 mg ER tablet Take  by mouth daily.  carboxymethylcellulose sodium (REFRESH LIQUIGEL) 1 % dlgl Apply 1 drop to eye four (4) times daily as needed.  OTHER,NON-FORMULARY, Eye promise 2 tabs daily    multivitamin (ONE A DAY) tablet Take 1 tablet by mouth daily. No current facility-administered medications for this visit.        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 side    Chronic kidney disease     seeing dr alvarez for overactive bladder/ kidneys dr Molina Shallow    Chronic tubulointerstitial nephritis     Erythema nodosum     RIGHT LEG AND FOOT    History of shingles     Ill-defined condition     possible tubulointerstitual disease    Ill-defined condition     raynaud's phenomenon    Ill-defined condition     erythema nodosum rt leg and foot    Ill-defined condition     plantar fiascitis    Ill-defined disease     lickens planus lt side of tongue    Psychiatric disorder     depression    Raynaud's phenomenon (by history or observed)     Sjogren's disease ([address]     Tinnitus         Past Surgical History:   Procedure Laterality Date    HX APPENDECTOMY      HX CATARACT REMOVAL Bilateral 2016    HX CHOLECYSTECTOMY  2015    HX COLONOSCOPY  2008    HX GYN      3 vaginal delivery    HX HYSTERECTOMY  HX KNEE REPLACEMENT  01/2017    LEFT TOTAL KNEE REPLACEMENT     HX ORTHOPAEDIC  2003    right knee 2003- TORN CARTILAGE REPAIR, ARTHROSCOPIC    HX OTHER SURGICAL      STRESS TEST IN 2010 - NORMAL PER PATIENT    HX UROLOGICAL      RIGHT ADRENAL MASS     [place] ABDOMEN SURGERY PROC UNLISTED  1/27/15    DAVINCI RIGHT ADRENALECTOMY and CHOLECYSTECOMTY       Family History   Problem Relation Age of Onset    Cancer Mother         LEUKEMIA    Cancer Father         LYMPHOMA    Cancer Brother         COLON CANCER    Anesth Problems Neg Hx         Social History     Socioeconomic History    Marital status:      Spouse name: Not on file    Number of children: Not on file    Years of education: Not on file    Highest education level: Not on file   Occupational History    Not on file   Tobacco Use    Smoking status: Never Smoker    Smokeless tobacco: Former User   Substance and Sexual Activity    Alcohol use: Yes     Alcohol/week: 14.0 standard drinks     Types: 14 Shots of liquor per week     Comment: socially    Drug use: No    Sexual activity: Not on file   Other Topics Concern    Not on file   Social History Narrative    Not on file     Social Determinants of Health     Financial Resource Strain:     Difficulty of Paying Living Expenses: Not on file   Food Insecurity:     Worried About Running Out of Food in the Last Year: Not on file    Charmaine of Food in the Last Year: Not on file   Transportation Needs:     Lack of Transportation (Medical): Not on file    Lack of Transportation (Non-Medical):  Not on file   Physical Activity:     Days of Exercise per Week: Not on file    Minutes of Exercise per Session: Not on file   Stress:     Feeling of Stress : Not on file   Social Connections:     Frequency of Communication with Friends and Family: Not on file    Frequency of Social Gatherings with Friends and Family: Not on file    Attends Restorationist Services: Not on file   [employer] or Organizations: Not on file    Attends Club or Organization Meetings: Not on file    Marital Status: Not on file   Intimate Partner Violence:     Fear of Current or Ex-Partner: Not on file    Emotionally Abused: Not on file    Physically Abused: Not on file    Sexually Abused: Not on file   Housing Stability:     Unable to Pay for Housing in the Last Year: Not on file    Number of [place] in the Last Year: Not on file    Unstable Housing in the Last Year: Not on file       Orders Placed This Encounter    XR INJ ASP LARGE JOINT / BURSA     Intraarticular corticosteroid and local anesthetic injection, left hip     Standing Status:   Future     Standing Expiration Date:   5/4/2023     Scheduling Instructions:      [facility] location    REFERRAL TO PHYSICAL THERAPY     Referral Priority:   Routine     Referral Type:   PT/OT/ST     Referral Reason:   Specialty Services Required     Number of Visits Requested:   1        An electronic signature was used to authenticate this note.   -- Larisa Luciano MD

## 2022-04-04 NOTE — LETTER
4/4/2022    Patient: Kailyn Herring   YOB: 1950   Date of Visit: 4/4/2022     Kate Bailon MD  [address] [address]  Via Fax: [phone]    Dear Kate Bailon MD,      Thank you for referring Ms. Anahy Yang to [facility] for evaluation. My notes for this consultation are attached. If you have questions, please do not hesitate to call me. I look forward to following your patient along with you.       Sincerely,    Marito Alvarado MD

## 2022-04-14 ENCOUNTER — HOSPITAL ENCOUNTER (OUTPATIENT)
Dept: GENERAL RADIOLOGY | Age: 72
Discharge: HOME | End: 2022-04-14
Attending: ORTHOPAEDIC SURGERY
Payer: MEDICARE

## 2022-04-14 DIAGNOSIS — M16.12 PRIMARY OSTEOARTHRITIS OF LEFT HIP: ICD-10-CM

## 2022-04-14 PROCEDURE — 74011000636 HC RX REV CODE- 636: Performed by: RADIOLOGY

## 2022-04-14 PROCEDURE — 74011000250 HC RX REV CODE- 250: Performed by: RADIOLOGY

## 2022-04-14 PROCEDURE — 20610 DRAIN/INJ JOINT/BURSA W/O US: CPT

## 2022-04-14 PROCEDURE — 74011250636 HC RX REV CODE- 250/636: Performed by: RADIOLOGY

## 2022-04-14 RX ORDER — LIDOCAINE HYDROCHLORIDE 10 MG/ML
2 INJECTION INFILTRATION; PERINEURAL
Status: COMPLETED | OUTPATIENT
Start: 2022-04-14 | End: 2022-04-14

## 2022-04-14 RX ORDER — BUPIVACAINE HYDROCHLORIDE 5 MG/ML
5 INJECTION, SOLUTION EPIDURAL; INTRACAUDAL
Status: COMPLETED | OUTPATIENT
Start: 2022-04-14 | End: 2022-04-14

## 2022-04-14 RX ORDER — TRIAMCINOLONE ACETONIDE 40 MG/ML
40 INJECTION, SUSPENSION INTRA-ARTICULAR; INTRAMUSCULAR
Status: COMPLETED | OUTPATIENT
Start: 2022-04-14 | End: 2022-04-14

## 2022-04-14 RX ADMIN — TRIAMCINOLONE ACETONIDE 40 MG: 40 INJECTION, SUSPENSION INTRA-ARTICULAR; INTRAMUSCULAR at 12:00

## 2022-04-14 RX ADMIN — IOHEXOL 20 ML: 180 INJECTION INTRAVENOUS at 12:00

## 2022-04-14 RX ADMIN — BUPIVACAINE HYDROCHLORIDE 25 MG: 5 INJECTION, SOLUTION EPIDURAL; INTRACAUDAL; PERINEURAL at 12:00

## 2022-04-14 RX ADMIN — LIDOCAINE HYDROCHLORIDE 2 ML: 10 INJECTION, SOLUTION INFILTRATION; PERINEURAL at 13:00

## 2022-04-22 ENCOUNTER — OFFICE VISIT (OUTPATIENT)
Dept: ORTHOPEDIC SURGERY | Age: 72
End: 2022-04-22
Payer: MEDICARE

## 2022-04-22 VITALS — BODY MASS INDEX: 29.73 KG/M2 | HEIGHT: 66 IN | WEIGHT: 185 LBS

## 2022-04-22 DIAGNOSIS — M25.572 LEFT ANKLE PAIN, UNSPECIFIED CHRONICITY: ICD-10-CM

## 2022-04-22 DIAGNOSIS — M84.372A STRESS FRACTURE OF LEFT ANKLE, INITIAL ENCOUNTER: Primary | ICD-10-CM

## 2022-04-22 PROCEDURE — G8419 CALC BMI OUT NRM PARAM NOF/U: HCPCS | Performed by: ORTHOPAEDIC SURGERY

## 2022-04-22 PROCEDURE — 3017F COLORECTAL CA SCREEN DOC REV: CPT | Performed by: ORTHOPAEDIC SURGERY

## 2022-04-22 PROCEDURE — G8400 PT W/DXA NO RESULTS DOC: HCPCS | Performed by: ORTHOPAEDIC SURGERY

## 2022-04-22 PROCEDURE — G8427 DOCREV CUR MEDS BY ELIG CLIN: HCPCS | Performed by: ORTHOPAEDIC SURGERY

## 2022-04-22 PROCEDURE — 99213 OFFICE O/P EST LOW 20 MIN: CPT | Performed by: ORTHOPAEDIC SURGERY

## 2022-04-22 PROCEDURE — 1090F PRES/ABSN URINE INCON ASSESS: CPT | Performed by: ORTHOPAEDIC SURGERY

## 2022-04-22 PROCEDURE — G8536 NO DOC ELDER MAL SCRN: HCPCS | Performed by: ORTHOPAEDIC SURGERY

## 2022-04-22 PROCEDURE — L1902 AFO ANKLE GAUNTLET PRE OTS: HCPCS | Performed by: ORTHOPAEDIC SURGERY

## 2022-04-22 PROCEDURE — G8432 DEP SCR NOT DOC, RNG: HCPCS | Performed by: ORTHOPAEDIC SURGERY

## 2022-04-22 PROCEDURE — 1101F PT FALLS ASSESS-DOCD LE1/YR: CPT | Performed by: ORTHOPAEDIC SURGERY

## 2022-04-22 RX ORDER — AZATHIOPRINE 50 MG/1
125 TABLET ORAL DAILY
COMMUNITY
Start: 2022-04-18

## 2022-04-22 NOTE — LETTER
4/22/2022    Patient: Sumaya Santiago   YOB: 1950   Date of Visit: 4/22/2022     Kendra Mederos MD  [address] [address]  Via Fax: [phone]    Dear Kendra Mederos MD,      Thank you for referring Ms. Angelique Greenberg to [facility] for evaluation. My notes for this consultation are attached. If you have questions, please do not hesitate to call me. I look forward to following your patient along with you.       Sincerely,    Santos Sosa MD

## 2022-04-22 NOTE — PROGRESS NOTES
Skyla Garcia (: 1950) is a 74 y.o. female, patient,here for evaluation of the following   Chief Complaint   Patient presents with    Ankle Pain     left ankle pain when she wakes up and puts her foot down she has alot of pain        ASSESSMENT/PLAN:  Below is the assessment and plan developed based on review of pertinent history, physical exam, labs, studies, and medications. 1. Stress fracture of left ankle, initial encounter  -     REFERRAL TO DME  -     NH AFO ANKLE GAUNTLET PRE OTS  2. Left ankle pain, unspecified chronicity  -     XR STANDING ANKLE LT MIN 3 V; Future  -     REFERRAL TO DME  -     NH AFO ANKLE GAUNTLET PRE OTS    The patient has pain focused to the medial malleolus mostly. She does not have a lot of pain at the medial joint space or the joint in general and this pain is specific over the medial malleolus so I am suspecting a stress fracture that we cannot see very well although on the x-rays I did not see a fracture line that is visible. There is no bone reaction however. She is at risk for stress fracture she has underlying kidney disease and poor bone quality and has been on steroid treatment. I recommended either a boot or brace. She also has other problems with the same lower extremity around the groin so I think a boot would not be very helpful, instead it may worsen her symptoms so I recommended a brace instead. We will try an ASO brace, possibly an upright brace if the ASO brace is putting too much pressure on the medial malleolus. Recommend activities as tolerated. We will see again and obtain new x-rays of the left ankle 3 views nonweightbearing      Return in about 4 weeks (around 2022) for repeat xrays.       Allergies   Allergen Reactions    Prednisone Other (comments)     Effects pts vision permanentely    Augmentin [Amoxicillin-Pot Clavulanate] Rash     Rash of lips    Entex [Phenylephrine-Guaifenesin] Rash     Heart racing    Levofloxacin Myalgia Knees hurt    Lotemax [Loteprednol Etabonate] Other (comments)     headaches    Omnicef [Cefdinir] Rash     Mouth blisters     Other Plant, Animal, Environmental Other (comments)     Styrofoam cups, blisters to mouth    Penicillin G Rash     Rash on lips    Scallops Nausea and Vomiting     And sensitive skin    Shellfish Derived Nausea and Vomiting     Scallops only       Current Outpatient Medications   Medication Sig    traMADoL (ULTRAM) 50 mg tablet Take 50 mg by mouth two (2) times daily as needed.  venlafaxine (EFFEXOR) 37.5 mg tablet Take 37.5 mg by mouth two (2) times a day.  sodium bicarbonate 650 mg tablet Take 650 mg by mouth two (2) times a day.  potassium chloride (K-DUR, KLOR-CON M20) 20 mEq tablet Take 20 mEq by mouth.  acetaminophen (TYLENOL) 500 mg tablet Take 1 Tab by mouth every four (4) hours (while awake). Schedule for pain control over the next 7-14 days. Do not exceed 3000 mg in 24 hours. Obtain over-the-counter. Indications: Postoperative Incisional Knee Pain    fexofenadine (ALLEGRA) 180 mg tablet Take 180 mg by mouth daily.  fluticasone (FLONASE) 50 mcg/actuation nasal spray nightly.  carboxymethylcellulose sodium (REFRESH LIQUIGEL) 1 % dlgl Apply 1 drop to eye four (4) times daily as needed.  cycloSPORINE (RESTASIS) 0.05 % ophthalmic emulsion Administer 1 drop to both eyes two (2) times a day.  azaTHIOprine (IMURAN) 50 mg tablet     aspirin delayed-release 325 mg tablet Take 1 Tab by mouth two (2) times a day. For blood clot prevention.  senna-docusate (PERICOLACE) 8.6-50 mg per tablet Take 1 Tab by mouth two (2) times a day. Take with full glass of fluid. Do not take if having frequent or loose BMs. Obtain over-the-counter. Indications: Prevention of Postoperative Constipation    Biotin 2,500 mcg cap Take 1,000 mcg by mouth daily.     OTHER USES 1 PROMISE EASY TEARS- HAS VITAMIN A, D, E AND FISH OIL IN IT.    MINERAL OIL, LIGHT/MINERAL OIL (SOOTHE XP OP) Apply  to eye.  artificial tears,hypromellose, (GENTEAL MODERATE) 0.3 % drop Administer  to both eyes as needed.  mirabegron ER (Myrbetriq) 25 mg ER tablet Take  by mouth daily.  OTHER,NON-FORMULARY, Eye promise 2 tabs daily    multivitamin (ONE A DAY) tablet Take 1 tablet by mouth daily. (Patient not taking: Reported on 4/22/2022)     No current facility-administered medications for this visit.        Past Medical History:   Diagnosis Date    Arthritis     osteo    Cancer ([facility] [address]     squamous cell on nose lt side    Chronic kidney disease     seeing dr alvarez for overactive bladder/ kidneys dr Valentina Zhong    Chronic tubulointerstitial nephritis     Erythema nodosum     RIGHT LEG AND FOOT    History of shingles     Ill-defined condition     possible tubulointerstitual disease    Ill-defined condition     raynaud's phenomenon    Ill-defined condition     erythema nodosum rt leg and foot    Ill-defined condition     plantar fiascitis    Ill-defined disease     lickens planus lt side of tongue    Psychiatric disorder     depression    Raynaud's phenomenon (by history or observed)     Sjogren's disease ([facility] [address]     Tinnitus        Past Surgical History:   Procedure Laterality Date    HX APPENDECTOMY      HX CATARACT REMOVAL Bilateral 2016    HX CHOLECYSTECTOMY  2015    HX COLONOSCOPY  2008    HX GYN      3 vaginal delivery    HX HYSTERECTOMY      HX KNEE REPLACEMENT  01/2017    LEFT TOTAL KNEE REPLACEMENT     HX ORTHOPAEDIC  2003    right knee 2003- TORN CARTILAGE REPAIR, ARTHROSCOPIC    HX OTHER SURGICAL      STRESS TEST IN 2010 - NORMAL PER PATIENT    HX UROLOGICAL      RIGHT ADRENAL MASS     [place] ABDOMEN SURGERY PROC UNLISTED  1/27/15    DAVINCI RIGHT ADRENALECTOMY and CHOLECYSTECOMTY       Family History   Problem Relation Age of Onset    Cancer Mother         LEUKEMIA    Cancer Father         LYMPHOMA    Cancer Brother         COLON CANCER    Anesth Problems Neg Hx Social History     Socioeconomic History    Marital status:      Spouse name: Not on file    Number of children: Not on file    Years of education: Not on file    Highest education level: Not on file   Occupational History    Not on file   Tobacco Use    Smoking status: Never Smoker    Smokeless tobacco: Former User   Substance and Sexual Activity    Alcohol use: Yes     Alcohol/week: 14.0 standard drinks     Types: 14 Shots of liquor per week     Comment: socially    Drug use: No    Sexual activity: Not on file   Other Topics Concern    Not on file   Social History Narrative    Not on file     Social Determinants of Health     Financial Resource Strain:     Difficulty of Paying Living Expenses: Not on file   Food Insecurity:     Worried About Running Out of Food in the Last Year: Not on file    Charmaine of Food in the Last Year: Not on file   Transportation Needs:     Lack of Transportation (Medical): Not on file    Lack of Transportation (Non-Medical):  Not on file   Physical Activity:     Days of Exercise per Week: Not on file    Minutes of Exercise per Session: Not on file   Stress:     Feeling of Stress : Not on file   Social Connections:     Frequency of Communication with Friends and Family: Not on file    Frequency of Social Gatherings with Friends and Family: Not on file    Attends Sabianism Services: Not on file    Active Member of [address] [place] [address] [employer] or Organizations: Not on file    Attends Club or Organization Meetings: Not on file    Marital Status: Not on file   Intimate Partner Violence:     Fear of Current or Ex-Partner: Not on file    Emotionally Abused: Not on file    Physically Abused: Not on file    Sexually Abused: Not on file   Housing Stability:     Unable to Pay for Housing in the Last Year: Not on file    Number of [place] in the Last Year: Not on file    Unstable Housing in the Last Year: Not on file           Vitals:  Ht 5' 6\" (1.676 m)   Wt 185 lb (83.9 kg)   BMI 29.86 kg/m²    Body mass index is 29.86 kg/m². SUBJECTIVE/OBJECTIVE:  Alem Zeng (: 1950)   Former patient presents today for new problem regarding the left ankle, she is experiencing pain at the medial malleolus area pointing to the medial malleolus. This pain is sudden onset but has gradually gotten worse over the past 6 weeks and not getting better. The pain is moderate to severe dull stabbing pain and constant comes and goes seems to be worse with stairs/steps, lifting and walking. Patient has tried rest, ice and getting worse. Patient is not diabetic. She does have stage IV kidney disease and Sjogren's, currently on cortisone treatment. Physical Exam  Pleasant well-nourished female , alert and oriented to person, time and place, no acute distress. Antalgic gait, satisfied weightbearing stance. Left lower extremity/ankle: There is mild swelling to the left ankle tenderness to palpation medial malleolus specifically, the posterior tibial tendon, lateral malleolus and peroneal tendons are nontender. There is mild tenderness to the medial joint line. Ligaments are grossly stable. Left foot: Nontender, no swelling, ligament stable. There is underlying pes planovalgus not severe, semirigid deformity. Contralateral foot and ankle exam, nontender, no swelling ligaments grossly stable. Normal weightbearing stance. Neurovascular exam intact for light touch sensation, cap refill, dorsalis pedis pulse palpable, flexion/extension strength 5/5. Skin intact without open wounds, lesions or ulcers, no skin discolorations, normal warmth to skin.       Imaging:    XR Results (most recent):  Results from Appointment encounter on 22    XR STANDING ANKLE LT MIN 3 V    Narrative  Left ankle standing AP, lateral and oblique x-rays show satisfactory bone density, possible stress fracture at the medial malleolus on oblique view there is a visible line indicating the possibility of a nondisplaced fracture. There is mild soft tissue swelling in the area. On AP view again the possible fracture line is visible, there is intra-articular loose body at distal tip of medial malleolus adjacent to the medial border of the talus. There also appears to be an osteochondral lesion medial talar dome. An electronic signature was used to authenticate this note.   -- Lucero Vee MD

## 2022-04-27 ENCOUNTER — TELEPHONE (OUTPATIENT)
Dept: ORTHOPEDIC SURGERY | Age: 72
End: 2022-04-27

## 2022-04-27 DIAGNOSIS — M84.372A STRESS FRACTURE OF LEFT ANKLE, INITIAL ENCOUNTER: Primary | ICD-10-CM

## 2022-04-27 DIAGNOSIS — M25.572 LEFT ANKLE PAIN, UNSPECIFIED CHRONICITY: ICD-10-CM

## 2022-05-04 ENCOUNTER — OFFICE VISIT (OUTPATIENT)
Dept: ORTHOPEDIC SURGERY | Age: 72
End: 2022-05-04
Payer: MEDICARE

## 2022-05-04 DIAGNOSIS — G89.29 CHRONIC PAIN OF LEFT ANKLE: ICD-10-CM

## 2022-05-04 DIAGNOSIS — M25.572 CHRONIC PAIN OF LEFT ANKLE: ICD-10-CM

## 2022-05-04 DIAGNOSIS — M95.8 OSTEOCHONDRAL DEFECT OF TALUS: Primary | ICD-10-CM

## 2022-05-04 PROCEDURE — 1090F PRES/ABSN URINE INCON ASSESS: CPT | Performed by: ORTHOPAEDIC SURGERY

## 2022-05-04 PROCEDURE — G8432 DEP SCR NOT DOC, RNG: HCPCS | Performed by: ORTHOPAEDIC SURGERY

## 2022-05-04 PROCEDURE — 3017F COLORECTAL CA SCREEN DOC REV: CPT | Performed by: ORTHOPAEDIC SURGERY

## 2022-05-04 PROCEDURE — G8400 PT W/DXA NO RESULTS DOC: HCPCS | Performed by: ORTHOPAEDIC SURGERY

## 2022-05-04 PROCEDURE — 1101F PT FALLS ASSESS-DOCD LE1/YR: CPT | Performed by: ORTHOPAEDIC SURGERY

## 2022-05-04 PROCEDURE — G8536 NO DOC ELDER MAL SCRN: HCPCS | Performed by: ORTHOPAEDIC SURGERY

## 2022-05-04 PROCEDURE — G8427 DOCREV CUR MEDS BY ELIG CLIN: HCPCS | Performed by: ORTHOPAEDIC SURGERY

## 2022-05-04 PROCEDURE — 99213 OFFICE O/P EST LOW 20 MIN: CPT | Performed by: ORTHOPAEDIC SURGERY

## 2022-05-04 PROCEDURE — G8419 CALC BMI OUT NRM PARAM NOF/U: HCPCS | Performed by: ORTHOPAEDIC SURGERY

## 2022-05-04 NOTE — PROGRESS NOTES
Julianne Mckay (: 1950) is a 74 y.o. female, patient,here for evaluation of the following   Chief Complaint   Patient presents with    Ankle Pain     left ankle MRI review (says we just got x-ray 10 days ago)         ASSESSMENT/PLAN:  Below is the assessment and plan developed based on review of pertinent history, physical exam, labs, studies, and medications. 1. Osteochondral defect of talus  2. Chronic pain of left ankle    Patient is informed of findings on MRI and we discussed the treatments. She continues to have pain to the ankle at left medial joint and medial malleolus area. She is informed that arthroscopic procedure may be the best next step since she has so far failed conservative treatment. We could consider a cortisone injection if it is only for arthritis however there is an osteochondral lesion with loose appearance which may benefit from arthroscopic debridement. We briefly discussed the surgical treatment. She is not interested in having surgery at this point as she has other planned events upcoming and I also did not recommend if she is can be out of town, surgery is not a good option. This again is an elective procedure so if she elects to proceed, she may need a medical risk assessment due to her age and other underlying medical conditions. She also has had a cortisone injection into the knee so it would be useful to wait at least 3 months after the injection that was done at the knee. I have answered all her questions and explained the procedure at length. More likely than not she will be weightbearing as tolerated after the surgery as the plan would be mostly debridement of the unstable osteochondral lesion. She will make an appointment in the future closer to the time when she wants to consider surgical treatment of the left ankle.   The surgical procedure discussed is left ankle arthroscopic osteochondral lesion debridement      Return if symptoms worsen or fail to improve. Allergies   Allergen Reactions    Prednisone Other (comments)     Effects pts vision permanentely    Augmentin [Amoxicillin-Pot Clavulanate] Rash     Rash of lips    Entex [Phenylephrine-Guaifenesin] Rash     Heart racing    Levofloxacin Myalgia     Knees hurt    Lotemax [Loteprednol Etabonate] Other (comments)     headaches    Omnicef [Cefdinir] Rash     Mouth blisters     Other Plant, Animal, Environmental Other (comments)     Styrofoam cups, blisters to mouth    Penicillin G Rash     Rash on lips    Scallops Nausea and Vomiting     And sensitive skin    Shellfish Derived Nausea and Vomiting     Scallops only       Current Outpatient Medications   Medication Sig    azaTHIOprine (IMURAN) 50 mg tablet     traMADoL (ULTRAM) 50 mg tablet Take 50 mg by mouth two (2) times daily as needed.  venlafaxine (EFFEXOR) 37.5 mg tablet Take 37.5 mg by mouth two (2) times a day.  sodium bicarbonate 650 mg tablet Take 650 mg by mouth two (2) times a day.  potassium chloride (K-DUR, KLOR-CON M20) 20 mEq tablet Take 20 mEq by mouth.  acetaminophen (TYLENOL) 500 mg tablet Take 1 Tab by mouth every four (4) hours (while awake). Schedule for pain control over the next 7-14 days. Do not exceed 3000 mg in 24 hours. Obtain over-the-counter. Indications: Postoperative Incisional Knee Pain    fexofenadine (ALLEGRA) 180 mg tablet Take 180 mg by mouth daily.  fluticasone (FLONASE) 50 mcg/actuation nasal spray nightly.  carboxymethylcellulose sodium (REFRESH LIQUIGEL) 1 % dlgl Apply 1 drop to eye four (4) times daily as needed.  cycloSPORINE (RESTASIS) 0.05 % ophthalmic emulsion Administer 1 drop to both eyes two (2) times a day. No current facility-administered medications for this visit.        Past Medical History:   Diagnosis Date    Arthritis     osteo    Cancer ([facility] [address]     squamous cell on nose lt side    Chronic kidney disease     seeing dr alvarez for overactive bladder/ kidneys  [facility]    Chronic tubulointerstitial nephritis     Erythema nodosum     RIGHT LEG AND FOOT    History of shingles     Ill-defined condition     possible tubulointerstitual disease    Ill-defined condition     raynaud's phenomenon    Ill-defined condition     erythema nodosum rt leg and foot    Ill-defined condition     plantar fiascitis    Ill-defined disease     lickens planus lt side of tongue    Psychiatric disorder     depression    Raynaud's phenomenon (by history or observed)     Sjogren's disease ([address]     Tinnitus        Past Surgical History:   Procedure Laterality Date    HX APPENDECTOMY      HX CATARACT REMOVAL Bilateral 2016    HX CHOLECYSTECTOMY  2015    HX COLONOSCOPY  2008    HX GYN      3 vaginal delivery    HX HYSTERECTOMY      HX KNEE REPLACEMENT  01/2017    LEFT TOTAL KNEE REPLACEMENT     HX ORTHOPAEDIC  2003    right knee 2003- [employer]ORN CARTILAGE REPAIR, ARTHROSCOPIC    HX OTHER SURGICAL      STRESS TEST IN 2010 - NORMAL PER PATIENT    HX UROLOGICAL      RIGHT ADRENAL MASS     [place] ABDOMEN SURGERY PROC UNLISTED  1/27/15    DAVINCI RIGHT ADRENALECTOMY and CHOLECYSTECOMTY       Family History   Problem Relation Age of Onset    Cancer Mother         LEUKEMIA    Cancer Father         LYMPHOMA    Cancer Brother         COLON CANCER    Anesth Problems Neg Hx        Social History     Socioeconomic History    Marital status:      Spouse name: Not on file    Number of children: Not on file    Years of education: Not on file    Highest education level: Not on file   Occupational History    Not on file   Tobacco Use    Smoking status: Never Smoker    Smokeless tobacco: Former User   Substance and Sexual Activity    Alcohol use:  Yes     Alcohol/week: 14.0 standard drinks     Types: 14 Shots of liquor per week     Comment: socially    Drug use: No    Sexual activity: Not on file   Other Topics Concern    Not on file   Social History Narrative    Not on file     Social Determinants of Health     Financial Resource Strain:     Difficulty of Paying Living Expenses: Not on file   Food Insecurity:     Worried About Running Out of Food in the Last Year: Not on file    Charmaine of Food in the Last Year: Not on file   Transportation Needs:     Lack of Transportation (Medical): Not on file    Lack of Transportation (Non-Medical): Not on file   Physical Activity:     Days of Exercise per Week: Not on file    Minutes of Exercise per Session: Not on file   Stress:     Feeling of Stress : Not on file   Social Connections:     Frequency of Communication with Friends and Family: Not on file    Frequency of Social Gatherings with Friends and Family: Not on file    Attends Hinduism Services: Not on file    Active Member of [address] [place] [address] [employer] or Organizations: Not on file    Attends Club or Organization Meetings: Not on file    Marital Status: Not on file   Intimate Partner Violence:     Fear of Current or Ex-Partner: Not on file    Emotionally Abused: Not on file    Physically Abused: Not on file    Sexually Abused: Not on file   Housing Stability:     Unable to Pay for Housing in the Last Year: Not on file    Number of [place] in the Last Year: Not on file    Unstable Housing in the Last Year: Not on file           Vitals: There were no vitals taken for this visit. There is no height or weight on file to calculate BMI. SUBJECTIVE/OBJECTIVE:  Shani Miller (: 1950)   Patient returns today after MRI completed, I had sent her for an MRI because of left ankle pain and x-rays were otherwise normal.  I had initially diagnosed possibility of a stress fracture. She continues to have the same pain focused towards the medial aspect of ankle and distal tibial plafond. Physical Exam  Pleasant well-nourished female , alert and oriented to person, time and place, no acute distress. Mild antalgia gait, satisfactory weightbearing stance.     Left lower extremity/ankle: Tenderness still present around the medial joint line and there is minimal swelling, there is no obvious crepitus and no obvious clicking sensations on range of motion but there is pain. The ankle joint is the most area of pain, the medial malleolus is mildly tender, lateral malleolus nontender. The Achilles tendon is intact. Left foot: There is no malalignment or deformity to the foot, nontender, no swelling, ligament stable. Contralateral foot and ankle exam, nontender, no swelling ligaments grossly stable. Normal weightbearing stance. Neurovascular exam intact for light touch sensation, cap refill, dorsalis pedis pulse palpable, flexion/extension strength 5/5. Skin intact without open wounds, lesions or ulcers, no skin discolorations, normal warmth to skin. Imaging:    Reviewed the previous x-rays obtained, these x-rays, documentation is below. Initially it was thought the possibility of a fracture however after review, not likely a fracture which is confirmed after MRI completed. XR Results (most recent):  Results from Appointment encounter on 04/22/22    XR STANDING ANKLE LT MIN 3 V    Narrative  Left ankle standing AP, lateral and oblique x-rays show satisfactory bone density, possible stress fracture at the medial malleolus on oblique view there is a visible line indicating the possibility of a nondisplaced fracture. There is mild soft tissue swelling in the area. On AP view again the possible fracture line is visible, there is intra-articular loose body at distal tip of medial malleolus adjacent to the medial border of the talus. There also appears to be an osteochondral lesion medial talar dome. MRI without contrast obtained on April 29, 2022 is reviewed on PACS are also reviewed with patient, these views show a medial talar dome osteochondral lesion with some early signs of arthritis near the medial and anterior tibial plafond cartilage.   There is chronic ATFL and anterior tibiofibular ligament sprains and chronic Planter fasciitis. The radiology report is in chart, summary is below. IMPRESSION  1. OCD lesion medial talar dome with thinning of the anterior medial tibial  plateau cartilage  2. Chronic injuries of the ATFL and anterior tibiofibular ligaments  3. Chronic plantar fasciitis  4. Abductor digiti minimi atrophy which can be seen with chronic Joe's  neuritis    An electronic signature was used to authenticate this note.   -- Aleene Apgar, MD

## 2022-05-04 NOTE — LETTER
5/10/2022    Patient: Joyce Aleman   YOB: 1950   Date of Visit: 5/4/2022     Damon Lazaro MD  [address] [address]  Via Fax: [phone]    Dear Damon Lazaro MD,      Thank you for referring Ms. Estefanía Mccloud to [facility] for evaluation. My notes for this consultation are attached. If you have questions, please do not hesitate to call me. I look forward to following your patient along with you.       Sincerely,    Genaro Lau MD

## 2022-07-25 ENCOUNTER — OFFICE VISIT (OUTPATIENT)
Dept: ORTHOPEDIC SURGERY | Age: 72
End: 2022-07-25
Payer: MEDICARE

## 2022-07-25 VITALS — WEIGHT: 180 LBS | HEIGHT: 66 IN | BODY MASS INDEX: 28.93 KG/M2

## 2022-07-25 DIAGNOSIS — S73.192D TEAR OF LEFT ACETABULAR LABRUM, SUBSEQUENT ENCOUNTER: Primary | ICD-10-CM

## 2022-07-25 PROCEDURE — G8400 PT W/DXA NO RESULTS DOC: HCPCS | Performed by: ORTHOPAEDIC SURGERY

## 2022-07-25 PROCEDURE — 1090F PRES/ABSN URINE INCON ASSESS: CPT | Performed by: ORTHOPAEDIC SURGERY

## 2022-07-25 PROCEDURE — 1123F ACP DISCUSS/DSCN MKR DOCD: CPT | Performed by: ORTHOPAEDIC SURGERY

## 2022-07-25 PROCEDURE — 3017F COLORECTAL CA SCREEN DOC REV: CPT | Performed by: ORTHOPAEDIC SURGERY

## 2022-07-25 PROCEDURE — G8432 DEP SCR NOT DOC, RNG: HCPCS | Performed by: ORTHOPAEDIC SURGERY

## 2022-07-25 PROCEDURE — 1101F PT FALLS ASSESS-DOCD LE1/YR: CPT | Performed by: ORTHOPAEDIC SURGERY

## 2022-07-25 PROCEDURE — G8427 DOCREV CUR MEDS BY ELIG CLIN: HCPCS | Performed by: ORTHOPAEDIC SURGERY

## 2022-07-25 PROCEDURE — G8536 NO DOC ELDER MAL SCRN: HCPCS | Performed by: ORTHOPAEDIC SURGERY

## 2022-07-25 PROCEDURE — 99213 OFFICE O/P EST LOW 20 MIN: CPT | Performed by: ORTHOPAEDIC SURGERY

## 2022-07-25 PROCEDURE — G8417 CALC BMI ABV UP PARAM F/U: HCPCS | Performed by: ORTHOPAEDIC SURGERY

## 2022-07-25 RX ORDER — ACETAMINOPHEN 500 MG
1000 TABLET ORAL 2 TIMES DAILY
COMMUNITY

## 2022-07-25 RX ORDER — PREDNISONE 5 MG/1
5 TABLET ORAL DAILY
COMMUNITY
Start: 2022-07-07

## 2022-07-25 RX ORDER — MELATONIN
2000 DAILY
COMMUNITY

## 2022-07-25 RX ORDER — ASCORBIC ACID 500 MG
1000 TABLET ORAL DAILY
COMMUNITY

## 2022-08-01 ENCOUNTER — OFFICE VISIT (OUTPATIENT)
Dept: ORTHOPEDIC SURGERY | Age: 72
End: 2022-08-01
Payer: MEDICARE

## 2022-08-01 VITALS — BODY MASS INDEX: 28.93 KG/M2 | WEIGHT: 180 LBS | HEIGHT: 66 IN

## 2022-08-01 DIAGNOSIS — M84.454D INSUFFICIENCY FRACTURE OF PELVIS WITH ROUTINE HEALING, SUBSEQUENT ENCOUNTER: Primary | ICD-10-CM

## 2022-08-01 PROCEDURE — 99213 OFFICE O/P EST LOW 20 MIN: CPT | Performed by: ORTHOPAEDIC SURGERY

## 2022-08-01 PROCEDURE — G8427 DOCREV CUR MEDS BY ELIG CLIN: HCPCS | Performed by: ORTHOPAEDIC SURGERY

## 2022-08-01 PROCEDURE — G8536 NO DOC ELDER MAL SCRN: HCPCS | Performed by: ORTHOPAEDIC SURGERY

## 2022-08-01 PROCEDURE — G8432 DEP SCR NOT DOC, RNG: HCPCS | Performed by: ORTHOPAEDIC SURGERY

## 2022-08-01 PROCEDURE — 1123F ACP DISCUSS/DSCN MKR DOCD: CPT | Performed by: ORTHOPAEDIC SURGERY

## 2022-08-01 PROCEDURE — G8400 PT W/DXA NO RESULTS DOC: HCPCS | Performed by: ORTHOPAEDIC SURGERY

## 2022-08-01 PROCEDURE — 3017F COLORECTAL CA SCREEN DOC REV: CPT | Performed by: ORTHOPAEDIC SURGERY

## 2022-08-01 PROCEDURE — G8417 CALC BMI ABV UP PARAM F/U: HCPCS | Performed by: ORTHOPAEDIC SURGERY

## 2022-08-01 PROCEDURE — 1090F PRES/ABSN URINE INCON ASSESS: CPT | Performed by: ORTHOPAEDIC SURGERY

## 2022-08-01 PROCEDURE — 1101F PT FALLS ASSESS-DOCD LE1/YR: CPT | Performed by: ORTHOPAEDIC SURGERY

## 2022-08-01 NOTE — LETTER
8/1/2022    Patient: Celia Sandoval   YOB: 1950   Date of Visit: 8/1/2022     Rodney Schmidt MD  [address] [facility] [address]  Via Fax: [phone]    Dear Rodney Schmidt MD,      Thank you for referring Ms. Chris Marques to [facility] for evaluation. My notes for this consultation are attached. If you have questions, please do not hesitate to call me. I look forward to following your patient along with you.       Sincerely,    Juan Antonio Laguna MD

## 2022-08-01 NOTE — PROGRESS NOTES
Joanna Mckeon (: 1950) is a 74 y.o. female, patient, here for evaluation of the following chief complaint(s):  Results (MRI of left hip )       SUBJECTIVE/OBJECTIVE:  Joanna Mckeon presents today for follow-up of left hip MRI. Results as outlined below. To review, she has had activity related pain around the left hip since late last year. No aching at rest or at night. Intra-articular injection provided moderate relief at the time, very temporary. I am not sure she really truly had relief. At her last visit she was pain-free but was still having intermittent pain with activity. When flared up she can be quite limited. PHYSICAL EXAM:  Vitals: Ht 5' 6.2\" (1.681 m)   Wt 180 lb (81.6 kg)   BMI 28.88 kg/m²   Body mass index is 28.88 kg/m². 74y.o. year old F, no distress. Exam of left hip is benign. No limp or Trendelenburg gait today. Negative Stinchfield left hip. No discomfort at extremes of hip range of motion which is well-preserved. No pain with lateral compression of pelvis. Symmetrical palpable distal pulses. No gross motor or sensory deficits in lower extremities. No distal edema. IMAGING:  Radiographs: Previous x-rays of left hip from January demonstrate mild loss of proximal hip joint space. Left hip MRI demonstrates probable insufficiency fractures of the left hemipelvis. Mild osteoarthritis in the hips. ASSESSMENT/PLAN:  1. Insufficiency fracture of pelvis with routine healing, subsequent encounter    Discussed conservative treatment. Relative rest with a walker for about 6 weeks. She is going on an tour to [facility] [place] later this month. She will use a walker between now and then, a cane on her trip, and use a walker for a few weeks when she returns. She will come back and see me in about 7 or 8 weeks for clinical recheck if still symptomatic. If pain is gone, she will return as needed in the future. No follow-ups on file.               Review Of Systems  ROS    Negative for: Constitutional, Gastrointestinal, Neurological, Skin, Genitourinary, Musculoskeletal, HENT, Endocrine, Cardiovascular, Eyes, Respiratory, Psychiatric, Allergic/Imm, Heme/Lymph  Last edited by Kadi Rendon on 8/1/2022  2:31 PM.         Patient denies any recent fever, chills, nausea, vomiting, chest pain, or shortness of breath. Allergies   Allergen Reactions    Prednisone Other (comments)     Effects pts vision permanentely    Augmentin [Amoxicillin-Pot Clavulanate] Rash     Rash of lips    Entex [Phenylephrine-Guaifenesin] Rash     Heart racing    Levofloxacin Myalgia     Knees hurt    Lotemax [Loteprednol Etabonate] Other (comments)     headaches    Omnicef [Cefdinir] Rash     Mouth blisters     Other Plant, Animal, Environmental Other (comments)     Styrofoam cups, blisters to mouth    Penicillin G Rash     Rash on lips    Scallops Nausea and Vomiting     And sensitive skin    Shellfish Derived Nausea and Vomiting     Scallops only       Current Outpatient Medications   Medication Sig    predniSONE (DELTASONE) 5 mg tablet Take 5 mg by mouth in the morning. cholecalciferol (VITAMIN D3) (1000 Units /25 mcg) tablet Take 2,000 Units by mouth in the morning. ascorbic acid, vitamin C, (VITAMIN C) 500 mg tablet Take 1,000 mg by mouth in the morning. acetaminophen (TYLENOL) 500 mg tablet Take 1,000 mg by mouth two (2) times a day. azaTHIOprine (IMURAN) 50 mg tablet Take 125 mg by mouth in the morning. traMADoL (ULTRAM) 50 mg tablet Take 50 mg by mouth two (2) times daily as needed. venlafaxine (EFFEXOR) 37.5 mg tablet Take 37.5 mg by mouth two (2) times a day. sodium bicarbonate 650 mg tablet Take 650 mg by mouth three (3) times daily. potassium chloride (K-DUR, KLOR-CON M20) 20 mEq tablet Take 20 mEq by mouth. fexofenadine (ALLEGRA) 180 mg tablet Take 180 mg by mouth daily.     fluticasone propionate (FLONASE) 50 mcg/actuation nasal spray 2 Sprays by Both Nostrils route nightly. carboxymethylcellulose sodium (REFRESH LIQUIGEL) 1 % dlgl ophthalmic solution Apply 1 drop to eye four (4) times daily as needed. cycloSPORINE (RESTASIS) 0.05 % dpet Administer 1 drop to both eyes two (2) times a day. No current facility-administered medications for this visit.        Past Medical History:   Diagnosis Date    Arthritis     osteo    Cancer ([facility] [address]     squamous cell on nose lt side    Chronic kidney disease     seeing dr alvarez for overactive bladder/ kidneys dr Gigi Woods    Chronic tubulointerstitial nephritis     Erythema nodosum     RIGHT LEG AND FOOT    History of shingles     Ill-defined condition     possible tubulointerstitual disease    Ill-defined condition     raynaud's phenomenon    Ill-defined condition     erythema nodosum rt leg and foot    Ill-defined condition     plantar fiascitis    Ill-defined disease     lickens planus lt side of tongue    Psychiatric disorder     depression    Raynaud's phenomenon (by history or observed)     Sjogren's disease ([facility] [address]     Tinnitus         Past Surgical History:   Procedure Laterality Date    HX APPENDECTOMY      HX CATARACT REMOVAL Bilateral 2016    HX CHOLECYSTECTOMY  2015    HX COLONOSCOPY  2008    HX GYN      3 vaginal delivery    HX HYSTERECTOMY      HX KNEE REPLACEMENT  01/2017    LEFT TOTAL KNEE REPLACEMENT     HX ORTHOPAEDIC  2003    right knee 2003- [address], ARTHROSCOPIC    HX OTHER SURGICAL      STRESS TEST IN 2010 - NORMAL PER PATIENT    HX UROLOGICAL      RIGHT ADRENAL MASS     [place] ABDOMEN SURGERY PROC UNLISTED  1/27/15    DAVINCI RIGHT ADRENALECTOMY and CHOLECYSTECOMTY       Family History   Problem Relation Age of Onset    Cancer Mother         LEUKEMIA    Cancer Father         LYMPHOMA    Cancer Brother         COLON CANCER    Anesth Problems Neg Hx         Social History     Socioeconomic History    Marital status:      Spouse name: Not on file    Number of children: Not on file Years of education: Not on file    Highest education level: Not on file   Occupational History    Not on file   Tobacco Use    Smoking status: Never    Smokeless tobacco: Former   Substance and Sexual Activity    Alcohol use: Yes     Alcohol/week: 14.0 standard drinks     Types: 14 Shots of liquor per week     Comment: socially    Drug use: No    Sexual activity: Not on file   Other Topics Concern    Not on file   Social History Narrative    Not on file     Social Determinants of Health     Financial Resource Strain: Not on file   Food Insecurity: Not on file   Transportation Needs: Not on file   Physical Activity: Not on file   Stress: Not on file   Social Connections: Not on file   Intimate Partner Violence: Not on file   Housing Stability: Not on file       No orders of the defined types were placed in this encounter. An electronic signature was used to authenticate this note.   -- Kourtney Mane MD

## 2022-09-16 ENCOUNTER — ANESTHESIA EVENT (OUTPATIENT)
Dept: ENDOSCOPY | Age: 72
End: 2022-09-16
Payer: MEDICARE

## 2022-09-16 ENCOUNTER — HOSPITAL ENCOUNTER (OUTPATIENT)
Age: 72
Setting detail: OUTPATIENT SURGERY
Discharge: HOME | End: 2022-09-16
Attending: INTERNAL MEDICINE | Admitting: INTERNAL MEDICINE
Payer: MEDICARE

## 2022-09-16 ENCOUNTER — ANESTHESIA (OUTPATIENT)
Dept: ENDOSCOPY | Age: 72
End: 2022-09-16
Payer: MEDICARE

## 2022-09-16 VITALS
HEIGHT: 67 IN | SYSTOLIC BLOOD PRESSURE: 106 MMHG | HEART RATE: 53 BPM | BODY MASS INDEX: 28.41 KG/M2 | TEMPERATURE: 98 F | DIASTOLIC BLOOD PRESSURE: 61 MMHG | OXYGEN SATURATION: 100 % | RESPIRATION RATE: 14 BRPM | WEIGHT: 181 LBS

## 2022-09-16 PROCEDURE — 74011000250 HC RX REV CODE- 250: Performed by: NURSE ANESTHETIST, CERTIFIED REGISTERED

## 2022-09-16 PROCEDURE — 76040000019: Performed by: INTERNAL MEDICINE

## 2022-09-16 PROCEDURE — 2709999900 HC NON-CHARGEABLE SUPPLY: Performed by: INTERNAL MEDICINE

## 2022-09-16 PROCEDURE — 76060000031 HC ANESTHESIA FIRST 0.5 HR: Performed by: INTERNAL MEDICINE

## 2022-09-16 PROCEDURE — 74011250636 HC RX REV CODE- 250/636: Performed by: NURSE ANESTHETIST, CERTIFIED REGISTERED

## 2022-09-16 PROCEDURE — 77030020268 HC MISC GENERAL SUPPLY: Performed by: INTERNAL MEDICINE

## 2022-09-16 PROCEDURE — 88305 TISSUE EXAM BY PATHOLOGIST: CPT

## 2022-09-16 RX ORDER — EPINEPHRINE 0.1 MG/ML
1 INJECTION INTRACARDIAC; INTRAVENOUS
Status: DISCONTINUED | OUTPATIENT
Start: 2022-09-16 | End: 2022-09-16 | Stop reason: HOSPADM

## 2022-09-16 RX ORDER — SODIUM CHLORIDE 0.9 % (FLUSH) 0.9 %
5-40 SYRINGE (ML) INJECTION EVERY 8 HOURS
Status: DISCONTINUED | OUTPATIENT
Start: 2022-09-16 | End: 2022-09-16 | Stop reason: HOSPADM

## 2022-09-16 RX ORDER — SODIUM CHLORIDE 9 MG/ML
150 INJECTION, SOLUTION INTRAVENOUS CONTINUOUS
Status: DISCONTINUED | OUTPATIENT
Start: 2022-09-16 | End: 2022-09-16 | Stop reason: HOSPADM

## 2022-09-16 RX ORDER — LIDOCAINE HYDROCHLORIDE 20 MG/ML
INJECTION, SOLUTION EPIDURAL; INFILTRATION; INTRACAUDAL; PERINEURAL AS NEEDED
Status: DISCONTINUED | OUTPATIENT
Start: 2022-09-16 | End: 2022-09-16 | Stop reason: HOSPADM

## 2022-09-16 RX ORDER — PROPOFOL 10 MG/ML
INJECTION, EMULSION INTRAVENOUS AS NEEDED
Status: DISCONTINUED | OUTPATIENT
Start: 2022-09-16 | End: 2022-09-16 | Stop reason: HOSPADM

## 2022-09-16 RX ORDER — DEXTROMETHORPHAN/PSEUDOEPHED 2.5-7.5/.8
1.2 DROPS ORAL
Status: DISCONTINUED | OUTPATIENT
Start: 2022-09-16 | End: 2022-09-16 | Stop reason: HOSPADM

## 2022-09-16 RX ORDER — SODIUM CHLORIDE 9 MG/ML
INJECTION, SOLUTION INTRAVENOUS
Status: DISCONTINUED | OUTPATIENT
Start: 2022-09-16 | End: 2022-09-16 | Stop reason: HOSPADM

## 2022-09-16 RX ORDER — ATROPINE SULFATE 0.1 MG/ML
0.5 INJECTION INTRAVENOUS
Status: DISCONTINUED | OUTPATIENT
Start: 2022-09-16 | End: 2022-09-16 | Stop reason: HOSPADM

## 2022-09-16 RX ORDER — FENTANYL CITRATE 50 UG/ML
200 INJECTION, SOLUTION INTRAMUSCULAR; INTRAVENOUS
Status: DISCONTINUED | OUTPATIENT
Start: 2022-09-16 | End: 2022-09-16 | Stop reason: HOSPADM

## 2022-09-16 RX ORDER — MIDAZOLAM HYDROCHLORIDE 1 MG/ML
.25-5 INJECTION, SOLUTION INTRAMUSCULAR; INTRAVENOUS
Status: DISCONTINUED | OUTPATIENT
Start: 2022-09-16 | End: 2022-09-16 | Stop reason: HOSPADM

## 2022-09-16 RX ORDER — SODIUM CHLORIDE 0.9 % (FLUSH) 0.9 %
5-40 SYRINGE (ML) INJECTION AS NEEDED
Status: DISCONTINUED | OUTPATIENT
Start: 2022-09-16 | End: 2022-09-16 | Stop reason: HOSPADM

## 2022-09-16 RX ADMIN — PROPOFOL 50 MG: 10 INJECTION, EMULSION INTRAVENOUS at 09:55

## 2022-09-16 RX ADMIN — PROPOFOL 50 MG: 10 INJECTION, EMULSION INTRAVENOUS at 09:59

## 2022-09-16 RX ADMIN — PROPOFOL 50 MG: 10 INJECTION, EMULSION INTRAVENOUS at 09:50

## 2022-09-16 RX ADMIN — PROPOFOL 50 MG: 10 INJECTION, EMULSION INTRAVENOUS at 09:51

## 2022-09-16 RX ADMIN — PROPOFOL 50 MG: 10 INJECTION, EMULSION INTRAVENOUS at 09:48

## 2022-09-16 RX ADMIN — PROPOFOL 25 MG: 10 INJECTION, EMULSION INTRAVENOUS at 10:05

## 2022-09-16 RX ADMIN — LIDOCAINE HYDROCHLORIDE 50 MG: 20 INJECTION, SOLUTION EPIDURAL; INFILTRATION; INTRACAUDAL; PERINEURAL at 09:46

## 2022-09-16 RX ADMIN — PROPOFOL 50 MG: 10 INJECTION, EMULSION INTRAVENOUS at 09:46

## 2022-09-16 RX ADMIN — SODIUM CHLORIDE: 900 INJECTION, SOLUTION INTRAVENOUS at 09:39

## 2022-09-16 RX ADMIN — PROPOFOL 25 MG: 10 INJECTION, EMULSION INTRAVENOUS at 10:02

## 2022-09-16 NOTE — H&P
[facility] [address], [address] [place] [address]          Pre-procedure History and Physical       NAME:  Wiliam Barton   :   1950   MRN:   [id number]     CHIEF COMPLAINT/HPI: fh of colon ca    PMH:  Past Medical History:   Diagnosis Date    Arthritis     osteo    Cancer ([facility] [address]     squamous cell on nose lt side    Chronic kidney disease     seeing dr alvarez for overactive bladder/ kidneys dr Stanley Hanson    Chronic tubulointerstitial nephritis     Erythema nodosum     RIGHT LEG AND FOOT    History of shingles     Ill-defined condition     possible tubulointerstitual disease    Ill-defined condition     raynaud's phenomenon    Ill-defined condition     erythema nodosum rt leg and foot    Ill-defined condition     plantar fiascitis    Ill-defined disease     lickens planus lt side of tongue    Psychiatric disorder     depression    Raynaud's phenomenon (by history or observed)     Sjogren's disease ([facility] [address]     Tinnitus        PSH:  Past Surgical History:   Procedure Laterality Date    HX APPENDECTOMY      HX CATARACT REMOVAL Bilateral 2016    HX CHOLECYSTECTOMY  2015    HX COLONOSCOPY  2008    HX GYN      3 vaginal delivery    HX HYSTERECTOMY      HX KNEE REPLACEMENT  2017    LEFT TOTAL KNEE REPLACEMENT     HX ORTHOPAEDIC  2003    right knee 2003- TORN CARTILAGE REPAIR, ARTHROSCOPIC    HX OTHER SURGICAL      STRESS TEST IN  - NORMAL PER PATIENT    HX UROLOGICAL      RIGHT ADRENAL MASS     NE ABDOMEN SURGERY PROC UNLISTED  1/27/15    DAVINCI RIGHT ADRENALECTOMY and CHOLECYSTECOMTY       Allergies:   Allergies   Allergen Reactions    Prednisone Other (comments)     Effects pts vision permanentely    Augmentin [Amoxicillin-Pot Clavulanate] Rash     Rash of lips    Entex [Phenylephrine-Guaifenesin] Rash     Heart racing    Levofloxacin Myalgia     Knees hurt    Lotemax [Loteprednol Etabonate] Other (comments)     headaches    Omnicef [Cefdinir] Rash     Mouth blisters     Other Plant, Animal, Environmental Other (comments)     Styrofoam cups, blisters to mouth    Penicillin G Rash     Rash on lips    Scallops Nausea and Vomiting     And sensitive skin    Shellfish Derived Nausea and Vomiting     Scallops only       Home Medications:  Prior to Admission Medications   Prescriptions Last Dose Informant Patient Reported? Taking?   acetaminophen (TYLENOL) 500 mg tablet   Yes No   Sig: Take 1,000 mg by mouth two (2) times a day. ascorbic acid, vitamin C, (VITAMIN C) 500 mg tablet 2022  Yes Yes   Sig: Take 1,000 mg by mouth in the morning. azaTHIOprine (IMURAN) 50 mg tablet 2022  Yes No   Sig: Take 125 mg by mouth in the morning. carboxymethylcellulose sodium (REFRESH LIQUIGEL) 1 % dlgl ophthalmic solution   Yes No   Sig: Apply 1 drop to eye four (4) times daily as needed. cholecalciferol (VITAMIN D3) (1000 Units /25 mcg) tablet 2022  Yes Yes   Sig: Take 2,000 Units by mouth in the morning. cycloSPORINE (RESTASIS) 0.05 % dpet 2022  Yes Yes   Sig: Administer 1 drop to both eyes two (2) times a day. fexofenadine (ALLEGRA) 180 mg tablet 2022  Yes No   Sig: Take 180 mg by mouth daily. fluticasone propionate (FLONASE) 50 mcg/actuation nasal spray   Yes No   Si Sprays by Both Nostrils route nightly. potassium chloride (K-DUR, KLOR-CON M20) 20 mEq tablet 2022  Yes No   Sig: Take 20 mEq by mouth.   predniSONE (DELTASONE) 5 mg tablet 2022  Yes No   Sig: Take 5 mg by mouth in the morning.   sodium bicarbonate 650 mg tablet 2022  Yes No   Sig: Take 650 mg by mouth three (3) times daily. traMADoL (ULTRAM) 50 mg tablet 9/15/2022  Yes Yes   Sig: Take 50 mg by mouth two (2) times daily as needed. venlafaxine (EFFEXOR) 37.5 mg tablet 2022  Yes No   Sig: Take 37.5 mg by mouth two (2) times a day.       Facility-Administered Medications: None       Hospital Medications:  Current Facility-Administered Medications   Medication Dose Route Frequency    0.9% sodium chloride infusion  150 mL/hr IntraVENous CONTINUOUS    sodium chloride (NS) flush 5-40 mL  5-40 mL IntraVENous Q8H    sodium chloride (NS) flush 5-40 mL  5-40 mL IntraVENous PRN    midazolam (VERSED) injection 0.25-5 mg  0.25-5 mg IntraVENous Multiple    fentaNYL citrate (PF) injection 200 mcg  200 mcg IntraVENous Multiple    simethicone (MYLICON) 62AH/8.3NC oral drops 80 mg  1.2 mL Oral Multiple    atropine injection 0.5 mg  0.5 mg IntraVENous ONCE PRN    EPINEPHrine (ADRENALIN) 0.1 mg/mL syringe 1 mg  1 mg Endoscopically ONCE PRN     Facility-Administered Medications Ordered in Other Encounters   Medication Dose Route Frequency    0.9% sodium chloride infusion   IntraVENous CONTINUOUS       Family History:  Family History   Problem Relation Age of Onset    Cancer Mother         LEUKEMIA    Cancer Father         LYMPHOMA    Cancer Brother         COLON CANCER    Anesth Problems Neg Hx        Social History:  Social History     Tobacco Use    Smoking status: Never    Smokeless tobacco: Former   Substance Use Topics    Alcohol use: Yes     Alcohol/week: 14.0 standard drinks     Types: 14 Shots of liquor per week     Comment: socially       The patient was counseled at length about the risks of ana Covid-19 in the margaret-operative and post-operative states including the recovery window of their procedure. The patient was made aware that ana Covid-19 after a surgical procedure may worsen their prognosis for recovering from the virus and lend to a higher morbidity and or mortality risk. The patient was given the options of postponing their procedure. All of the risks, benefits, and alternatives were discussed. The patient does  wish to proceed with the procedure. PHYSICAL EXAM PRIOR TO SEDATION:  General: Alert, in no acute distress    Lungs:            CTA bilaterally  Heart:  Normal S1, S2    Abdomen: Soft, Non distended, Non tender. Normoactive bowel sounds.       Assessment:   Stable for sedation administration.   Date of last colonoscopy: 7 yrs, Polyps  No    Plan:     Endoscopic procedure with sedation     Signed By: Hortencia Garcia MD     9/16/2022  9:44 AM

## 2022-09-16 NOTE — PROCEDURES
[facility] [address]12 Nery Horowitz M.D.  [facility], [address] [place] [address]  [phone]               Colonoscopy Procedure Note    NAME: Rema Nieves  :  1950  MRN:  [id number]    Indications:   Family history of coloretal cancer (screening only)     : Bing Yousif MD    Referring Provider:  Addison Mendoza MD    Staff: Endoscopy Guilherme Bel: Rupert Mcdonnell  Endoscopy RN-1: Lidia Villarreal RN    Prosthetic devices, grafts, tissues, transplant, or devices implanted: none    Medicines:  MAC anesthesia      Procedure Details:  After informed consent was obtained with all risks and benefits of the procedure explained and preprocedure exam completed, the patient was placed in the left lateral decubitus position. Universal protocol for patient identification was performed and documented in the nursing notes. Throughout the procedure, the patient's blood pressure was monitored at least every five minutes; pulse, and oxygen saturations were monitored continuously. All vital signs were documented in the nursing notes. A digital rectal exam was performed and was normal.  The Olympus videocolonoscope  was inserted in the rectum and carefully advanced to the cecum, which was identified by the ileocecal valve and appendiceal orifice. The colonoscope was slowly withdrawn with careful evaluation between folds. Retroflexion in the rectum was performed; findings and interventions are described below. Procedure start time, extent reached time/cecum time, and procedure end time are documented in the nursing notes. The quality of preparation was adequate.        Findings:   3 sessile polyps with greatest diameter of 7 mm (1 in the transverse, 1 in the descending, 1 in the rectum) s/p cold snare polypectomy  Mild sigmoid diverticulosis  Redundant colon    Interventions:    3 complete polypectomy were performed using cold snare  and the polyps were  retrieved    Specimens:   ID Type Source Tests Collected by Time Destination   1 : Rectal Polyp Preservative Rectum  Jeff Lombardo MD 9/16/2022 8600 Pathology   2 : Transverse Colon Polyp Preservative Colon, Transverse  Jeff Lombardo MD 9/16/2022 1000 Pathology   3 : Descending Colon Polyp Preservative Colon, Descending  Jeff Lombardo MD 9/16/2022 1003 Pathology       EBL:  None. Complications:   No immediate complications     Impression:  -See post-procedure diagnoses. Recommendations:   -Repeat colonoscopy in 3-5 years based on pathology. If < 10 years, reason:  above average risk patient    Resume normal medication(s). Signed by:  Leslie Plata MD          9/16/2022  10:10 AM

## 2022-09-16 NOTE — ANESTHESIA POSTPROCEDURE EVALUATION
Post-Anesthesia Evaluation and Assessment    Patient: Wiliam Barton MRN: [id number]  SSN: [id number]    YOB: 1950  Age: 74 y.o. Sex: female      I have evaluated the patient and they are stable and ready for discharge from the PACU. Cardiovascular Function/Vital Signs  Visit Vitals  /61   Pulse (!) 53   Temp 36.7 °C (98 °F)   Resp 14   Ht 5' 6.5\" (1.689 m)   Wt 82.1 kg (181 lb)   SpO2 100%   Breastfeeding No   BMI 28.78 kg/m²       Patient is status post MAC anesthesia for Procedure(s):  COLONOSCOPY  ENDOSCOPIC POLYPECTOMY. Nausea/Vomiting: None    Postoperative hydration reviewed and adequate. Pain:  Pain Scale 1: Numeric (0 - 10) (09/16/22 1025)  Pain Intensity 1: 0 (09/16/22 1025)   Managed    Neurological Status: At baseline    Mental Status, Level of Consciousness: Alert and  oriented to person, place, and time    Pulmonary Status:   O2 Device: None (Room air) (09/16/22 1025)   Adequate oxygenation and airway patent    Complications related to anesthesia: None    Post-anesthesia assessment completed. No concerns    Signed By: Payton Wheeler MD     September 16, 2022              Procedure(s):  COLONOSCOPY  ENDOSCOPIC POLYPECTOMY. MAC    <BSHSIANPOST>    INITIAL Post-op Vital signs:   Vitals Value Taken Time   /61 09/16/22 1025   Temp 36.7 °C (98 °F) 09/16/22 1014   Pulse 53 09/16/22 1028   Resp 18 09/16/22 1028   SpO2 100 % 09/16/22 1028   Vitals shown include unvalidated device data.

## 2022-09-16 NOTE — PROGRESS NOTES
Joanna Mike  1950  [id number]    Situation:  Verbal report received from: Max Macias RN  Procedure: Procedure(s):  COLONOSCOPY  ENDOSCOPIC POLYPECTOMY    Background:    Preoperative diagnosis: FAMILY HISTORY COLON CANCER  Postoperative diagnosis: 1. Colon Polyps  2. Diverticulosis    :  Dr. Juan Francisco Acosta  Assistant(s): Endoscopy Technician-1: Zachariah Butler  Endoscopy RN-1: Katerine Christian RN    Specimens:   ID Type Source Tests Collected by Time Destination   1 : Rectal Polyp Preservative Rectum  Jovana Akers MD 9/16/2022 8456 Pathology   2 : Transverse Colon Polyp Preservative Colon, Transverse  Jovana Akers MD 9/16/2022 1000 Pathology   3 : Descending Colon Polyp Preservative Colon, Descending  Jovana Akers MD 9/16/2022 1003 Pathology     H. Pylori  no    Assessment:  Intra-procedure medications   Anesthesia gave intra-procedure sedation and medications, see anesthesia flow sheet yes    Intravenous fluids: NS@ KVO     Vital signs stable     Abdominal assessment: round and soft     Recommendation:  Discharge patient per MD order.   Family or Friend - - Michelle Avendaño  Permission to share finding with family or friend

## 2022-09-16 NOTE — PROGRESS NOTES

## 2022-09-16 NOTE — ANESTHESIA PREPROCEDURE EVALUATION
Relevant Problems   RESPIRATORY SYSTEM   (+) Sleep apnea      GASTROINTESTINAL   (+) GERD (gastroesophageal reflux disease)      RENAL FAILURE   (+) CKD (chronic kidney disease)       Anesthetic History               Review of Systems / Medical History  Patient summary reviewed, nursing notes reviewed and pertinent labs reviewed    Pulmonary        Sleep apnea           Neuro/Psych         Psychiatric history     Cardiovascular              Hyperlipidemia    Exercise tolerance: >4 METS     GI/Hepatic/Renal     GERD           Endo/Other        Arthritis     Other Findings              Physical Exam    Airway  Mallampati: II  TM Distance: 4 - 6 cm  Neck ROM: normal range of motion   Mouth opening: Normal     Cardiovascular  Regular rate and rhythm,  S1 and S2 normal,  no murmur, click, rub, or gallop             Dental  No notable dental hx       Pulmonary  Breath sounds clear to auscultation               Abdominal  GI exam deferred       Other Findings            Anesthetic Plan    ASA: 2  Anesthesia type: MAC          Induction: Intravenous  Anesthetic plan and risks discussed with: Patient

## 2022-09-16 NOTE — DISCHARGE INSTRUCTIONS
[facility] 912 Willy Schreiber M.D.  [address] [place] [address], [facility] Zahra Flores   [phone]          COLONOSCOPY DISCHARGE INSTRUCTIONS    Myrna Amaya  [id number]  1950    DISCOMFORT:  Redness at IV site- apply warm compress to area; if redness or soreness persist- contact your physician  There may be a slight amount of blood passed from the rectum  Gaseous discomfort- walking, belching will help relieve any discomfort  You may not operate a vehicle for 12 hours  You may not engage in an occupation involving machinery or appliances for the  rest of today  You may not drink alcoholic beverages for at least 12 hours  Avoid making any critical decisions for at least 24 hours    DIET:   You may resume your normal diet, but some patients find that heavy or large  meals may lead to indigestion or vomiting. I suggest a light meal as first food  intake. I recommend a whole food, plant-based diet for your overall health. ACTIVITY:  You may resume your normal daily activities. It is recommended that you spend the remainder of the day resting - avoid any strenuous activity. CALL M.D. IF ANY SIGN OF:   Increasing pain, nausea, vomiting  Abdominal distension (swelling)  Significant bleeding (oral or rectal)  Fever   Pain in chest area  Shortness of breath    Additional Instructions:   Call Dr. Willy Schreiber if any questions or problems at [phone]   You should receive the biopsy results by phone or mail within 3 weeks, if not, call  my office for the results      Should have a repeat colonoscopy in 3-5 years based on pathology. Colonoscopy showed 3 polyps removed and diverticulosis. Learning About Coronavirus [phone])  Coronavirus [phone]): Overview  What is coronavirus (COVID-19)? The coronavirus disease (COVID-19) is caused by a virus. It is an illness that was first found in [place], [place], in December 2019. It has since spread worldwide.   The virus can cause fever, cough, and trouble breathing. In severe cases, it can cause pneumonia and make it hard to breathe without help. It can cause death. Coronaviruses are a large group of viruses. They cause the common cold. They also cause more serious illnesses like Middle East respiratory syndrome (MERS) and severe acute respiratory syndrome (SARS). COVID-19 is caused by a novel coronavirus. That means it's a new type that has not been seen in people before. This virus spreads person-to-person through droplets from coughing and sneezing. It can also spread when you are close to someone who is infected. And it can spread when you touch something that has the virus on it, such as a doorknob or a tabletop. What can you do to protect yourself from coronavirus (COVID-19)? The best way to protect yourself from getting sick is to: Avoid areas where there is an outbreak. Avoid contact with people who may be infected. Wash your hands often with soap or alcohol-based hand sanitizers. Avoid crowds and try to stay at least 6 feet away from other people. Wash your hands often, especially after you cough or sneeze. Use soap and water, and scrub for at least 20 seconds. If soap and water aren't available, use an alcohol-based hand . Avoid touching your mouth, nose, and eyes. What can you do to avoid spreading the virus to others? To help avoid spreading the virus to others:  Cover your mouth with a tissue when you cough or sneeze. Then throw the tissue in the trash. Use a disinfectant to clean things that you touch often. Stay home if you are sick or have been exposed to the virus. Don't go to school, work, or public areas. And don't use public transportation. If you are sick:  Leave your home only if you need to get medical care. But call the doctor's office first so they know you're coming. And wear a face mask, if you have one. If you have a face mask, wear it whenever you're around other people.  It can help stop the spread of the virus when you cough or sneeze. Clean and disinfect your home every day. Use household  and disinfectant wipes or sprays. Take special care to clean things that you grab with your hands. These include doorknobs, remote controls, phones, and handles on your refrigerator and microwave. And don't forget countertops, tabletops, bathrooms, and computer keyboards. When to call for help  Call 911 anytime you think you may need emergency care. For example, call if:  You have severe trouble breathing. (You can't talk at all.)  You have constant chest pain or pressure. You are severely dizzy or lightheaded. You are confused or can't think clearly. Your face and lips have a blue color. You pass out (lose consciousness) or are very hard to wake up. Call your doctor now if you develop symptoms such as:  Shortness of breath. Fever. Cough. If you need to get care, call ahead to the doctor's office for instructions before you go. Make sure you wear a face mask, if you have one, to prevent exposing other people to the virus. Where can you get the latest information? The following health organizations are tracking and studying this virus. Their websites contain the most up-to-date information. [facility] also learn what to do if you think you may have been exposed to the virus. U.S. Centers for Disease Control and Prevention (CDC): The CDC provides updated news about the disease and travel advice. The website also tells you how to prevent the spread of infection. www.cdc.gov  World Health Organization [facility]): WHO offers information about the virus outbreaks. WHO also has travel advice. www.who.int  Current as of: April 1, 2020               Content Version: 12.4  © 5087-1117 [employer].    Care instructions adapted under license by your healthcare professional. If you have questions about a medical condition or this instruction, always ask your healthcare professional. [address] any warranty or liability for your use of this information.

## 2023-05-21 RX ORDER — PREDNISONE 5 MG/1
5 TABLET ORAL DAILY
COMMUNITY
Start: 2022-07-07

## 2023-05-21 RX ORDER — SODIUM BICARBONATE 650 MG/1
650 TABLET ORAL 3 TIMES DAILY
COMMUNITY
Start: 2021-08-02

## 2023-05-21 RX ORDER — AZATHIOPRINE 50 MG/1
125 TABLET ORAL DAILY
COMMUNITY
Start: 2022-04-18

## 2023-05-21 RX ORDER — POTASSIUM CHLORIDE 20 MEQ/1
20 TABLET, EXTENDED RELEASE ORAL
COMMUNITY

## 2023-05-21 RX ORDER — TRAMADOL HYDROCHLORIDE 50 MG/1
50 TABLET ORAL 2 TIMES DAILY PRN
COMMUNITY
Start: 2022-03-31

## 2023-05-21 RX ORDER — ACETAMINOPHEN 500 MG
1000 TABLET ORAL 2 TIMES DAILY
COMMUNITY

## 2023-05-21 RX ORDER — FLUTICASONE PROPIONATE 50 MCG
2 SPRAY, SUSPENSION (ML) NASAL
COMMUNITY

## 2023-05-21 RX ORDER — ASCORBIC ACID 500 MG
1000 TABLET ORAL DAILY
COMMUNITY

## 2023-05-21 RX ORDER — CYCLOSPORINE 0.5 MG/ML
1 EMULSION OPHTHALMIC 2 TIMES DAILY
COMMUNITY

## 2023-05-21 RX ORDER — VENLAFAXINE 37.5 MG/1
37.5 TABLET ORAL 2 TIMES DAILY
COMMUNITY

## 2023-05-21 RX ORDER — CARBOXYMETHYLCELLULOSE SODIUM 10 MG/ML
1 GEL OPHTHALMIC 4 TIMES DAILY PRN
COMMUNITY

## 2023-05-21 RX ORDER — FEXOFENADINE HCL 180 MG/1
180 TABLET ORAL DAILY
COMMUNITY

## (undated) DEVICE — CONTAINER,SPECIMEN,3OZ,OR STRL: Brand: MEDLINE

## (undated) DEVICE — STRYKER PERFORMANCE SERIES SAGITTAL BLADE: Brand: STRYKER PERFORMANCE SERIES

## (undated) DEVICE — TRAP SURG QUAD PARABOLA SLOT DSGN SFTY SCRN TRAPEASE

## (undated) DEVICE — EXACTO COLD SNARE

## (undated) DEVICE — DERMABOND SKIN ADH 0.7ML -- DERMABOND ADVANCED 12/BX

## (undated) DEVICE — SUTURE STRATAFIX SPRL SZ 1 L14IN ABSRB VLT L48CM CTX 1/2 SXPD2B405

## (undated) DEVICE — INFECTION CONTROL KIT SYS

## (undated) DEVICE — (D)PREP SKN CHLRAPRP APPL 26ML -- CONVERT TO ITEM 371833

## (undated) DEVICE — HANDPIECE SET WITH BONE CLEANING TIP AND SUCTION TUBE: Brand: INTERPULSE

## (undated) DEVICE — DRAPE,EXTREMITY,89X128,STERILE: Brand: MEDLINE

## (undated) DEVICE — SYR 10ML LUER LOK 1/5ML GRAD --

## (undated) DEVICE — CARTRIDGE BNE CEM MIX UNIV TWR VAC ROTOR BRK OFF NOZ W/O

## (undated) DEVICE — SOLUTION IRRIG 1000ML H2O STRL BLT

## (undated) DEVICE — STERILE POLYISOPRENE POWDER-FREE SURGICAL GLOVES: Brand: PROTEXIS

## (undated) DEVICE — PREP SKN PREVAIL 40ML APPL --

## (undated) DEVICE — Z DISCONTINUED USE 2744636  DRESSING AQUACEL 14 IN ALG W3.5XL14IN POLYUR FLM CVR W/ HYDRCOLL

## (undated) DEVICE — 4-PORT MANIFOLD: Brand: NEPTUNE 2

## (undated) DEVICE — STERILE POLYISOPRENE POWDER-FREE SURGICAL GLOVES WITH EMOLLIENT COATING: Brand: PROTEXIS

## (undated) DEVICE — Device

## (undated) DEVICE — CUSTOM CAST PD STR

## (undated) DEVICE — SUTURE MCRYL SZ 3-0 L27IN ABSRB UD L24MM PS-1 3/8 CIR PRIM Y936H

## (undated) DEVICE — LIGHT HANDLE: Brand: DEVON

## (undated) DEVICE — SUTURE VCRL 1 L27IN ABSRB CT BRAID COAT UD J281H

## (undated) DEVICE — SOLUTION IV 50ML 0.9% SOD CHL

## (undated) DEVICE — SOLUTION IRRIG 3000ML 0.9% SOD CHL FLX CONT 0797208] ICU MEDICAL INC]

## (undated) DEVICE — SUTURE VCRL SZ 0 L27IN ABSRB UD L36MM CT-1 1/2 CIR J260H

## (undated) DEVICE — GAUZE SPONGES,12 PLY: Brand: CURITY

## (undated) DEVICE — SCRUB DRY SURG EZ SCRUB BRUSH PREOPERATIVE GRN

## (undated) DEVICE — 3M™ IOBAN™ 2 ANTIMICROBIAL INCISE DRAPE 6651EZ: Brand: IOBAN™ 2

## (undated) DEVICE — X-RAY SPONGES,16 PLY: Brand: DERMACEA

## (undated) DEVICE — GOWN,PREVENTION PLUS,XLN/2XL,ST,22/CS: Brand: MEDLINE

## (undated) DEVICE — SYRINGE MED 20ML STD CLR PLAS LUERLOCK TIP N CTRL DISP

## (undated) DEVICE — T5 HOOD WITH PEEL AWAY FACE SHIELD

## (undated) DEVICE — DEVON™ KNEE AND BODY STRAP 60" X 3" (1.5 M X 7.6 CM): Brand: DEVON

## (undated) DEVICE — ZIMMER® STERILE DISPOSABLE TOURNIQUET CUFF WITH PLC, DUAL PORT, SINGLE BLADDER, 34 IN. (86 CM)

## (undated) DEVICE — HOOK LOCK LATEX FREE ELASTIC BANDAGE D/L 6INX10YD

## (undated) DEVICE — TRAY CATH W/ 16FR CATH URIN M CTRL FIT OUTLT TB F STATLOK

## (undated) DEVICE — SUTURE VCRL SZ 2-0 L36IN ABSRB UD L40MM CT 1/2 CIR J957H

## (undated) DEVICE — REM POLYHESIVE ADULT PATIENT RETURN ELECTRODE: Brand: VALLEYLAB